# Patient Record
Sex: MALE | Race: BLACK OR AFRICAN AMERICAN | NOT HISPANIC OR LATINO | Employment: OTHER | URBAN - METROPOLITAN AREA
[De-identification: names, ages, dates, MRNs, and addresses within clinical notes are randomized per-mention and may not be internally consistent; named-entity substitution may affect disease eponyms.]

---

## 2017-12-23 RX ORDER — PHENYTOIN SODIUM 100 MG/1
CAPSULE, EXTENDED RELEASE ORAL
Qty: 90 CAPSULE | Refills: 1 | Status: SHIPPED | OUTPATIENT
Start: 2017-12-23 | End: 2019-02-19 | Stop reason: SDUPTHER

## 2017-12-24 NOTE — TELEPHONE ENCOUNTER
Call tell lab due CBC,CMP,Lipid,T4,TSH, dilantin level, U/A if DM add HGBA1C if over 2-3 yrs add Chest Xray EKG needs see me 2 weeks after lab get results, get in computer , get refills.

## 2018-08-30 ENCOUNTER — OFFICE VISIT (OUTPATIENT)
Dept: PRIMARY CARE CLINIC | Facility: CLINIC | Age: 68
End: 2018-08-30
Payer: MEDICARE

## 2018-08-30 VITALS
HEART RATE: 60 BPM | SYSTOLIC BLOOD PRESSURE: 127 MMHG | TEMPERATURE: 98 F | OXYGEN SATURATION: 99 % | WEIGHT: 113 LBS | BODY MASS INDEX: 18.16 KG/M2 | HEIGHT: 66 IN | RESPIRATION RATE: 18 BRPM | DIASTOLIC BLOOD PRESSURE: 64 MMHG

## 2018-08-30 DIAGNOSIS — K08.109 EDENTULOUS: ICD-10-CM

## 2018-08-30 DIAGNOSIS — R62.7 FTT (FAILURE TO THRIVE) IN ADULT: ICD-10-CM

## 2018-08-30 DIAGNOSIS — L29.9 PRURITUS: ICD-10-CM

## 2018-08-30 DIAGNOSIS — Z00.00 PHYSICAL EXAM: ICD-10-CM

## 2018-08-30 DIAGNOSIS — E44.1 MILD PROTEIN-CALORIE MALNUTRITION: ICD-10-CM

## 2018-08-30 DIAGNOSIS — J40 BRONCHITIS: ICD-10-CM

## 2018-08-30 DIAGNOSIS — Z85.46 HISTORY OF PROSTATE CANCER: ICD-10-CM

## 2018-08-30 DIAGNOSIS — J32.9 SINUSITIS, UNSPECIFIED CHRONICITY, UNSPECIFIED LOCATION: Primary | ICD-10-CM

## 2018-08-30 DIAGNOSIS — Z93.59 SUPRAPUBIC CATHETER: ICD-10-CM

## 2018-08-30 DIAGNOSIS — M81.6 LOCALIZED OSTEOPOROSIS OF LEQUESNE: ICD-10-CM

## 2018-08-30 PROCEDURE — 96372 THER/PROPH/DIAG INJ SC/IM: CPT | Mod: PBBFAC,PN

## 2018-08-30 PROCEDURE — 99213 OFFICE O/P EST LOW 20 MIN: CPT | Mod: S$PBB,,, | Performed by: FAMILY MEDICINE

## 2018-08-30 PROCEDURE — 1101F PT FALLS ASSESS-DOCD LE1/YR: CPT | Mod: CPTII,,, | Performed by: FAMILY MEDICINE

## 2018-08-30 PROCEDURE — 99999 PR PBB SHADOW E&M-EST. PATIENT-LVL III: CPT | Mod: PBBFAC,,, | Performed by: FAMILY MEDICINE

## 2018-08-30 PROCEDURE — 99213 OFFICE O/P EST LOW 20 MIN: CPT | Mod: PBBFAC,PN,25 | Performed by: FAMILY MEDICINE

## 2018-08-30 PROCEDURE — 99499 UNLISTED E&M SERVICE: CPT | Mod: S$GLB,,, | Performed by: FAMILY MEDICINE

## 2018-08-30 RX ORDER — ASPIRIN 81 MG/1
81 TABLET ORAL DAILY
COMMUNITY

## 2018-08-30 RX ORDER — PROMETHAZINE HYDROCHLORIDE AND CODEINE PHOSPHATE 6.25; 1 MG/5ML; MG/5ML
5 SOLUTION ORAL EVERY 6 HOURS PRN
Qty: 180 ML | Refills: 0 | Status: SHIPPED | OUTPATIENT
Start: 2018-08-30 | End: 2019-02-19

## 2018-08-30 RX ORDER — BETAMETHASONE SODIUM PHOSPHATE AND BETAMETHASONE ACETATE 3; 3 MG/ML; MG/ML
12 INJECTION, SUSPENSION INTRA-ARTICULAR; INTRALESIONAL; INTRAMUSCULAR; SOFT TISSUE
Status: COMPLETED | OUTPATIENT
Start: 2018-08-30 | End: 2018-08-30

## 2018-08-30 RX ORDER — AZITHROMYCIN 250 MG/1
TABLET, FILM COATED ORAL
Qty: 6 TABLET | Refills: 0 | Status: SHIPPED | OUTPATIENT
Start: 2018-08-30 | End: 2018-09-03

## 2018-08-30 RX ORDER — METHYLPREDNISOLONE 4 MG/1
TABLET ORAL
Qty: 1 PACKAGE | Refills: 0 | Status: SHIPPED | OUTPATIENT
Start: 2018-08-30 | End: 2019-02-19

## 2018-08-30 RX ADMIN — BETAMETHASONE SODIUM PHOSPHATE AND BETAMETHASONE ACETATE 12 MG: 3; 3 INJECTION, SUSPENSION INTRA-ARTICULAR; INTRALESIONAL; INTRAMUSCULAR at 03:08
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
## 2018-08-30 NOTE — PROGRESS NOTES
Patient ID by name and . NKDA. Celestone 12 mg IM injection given in left ventrogluteal using aseptic technique. Aspirated with no blood noted. Patient tolerated well. Given per physicians order. No adverse reaction noted

## 2019-02-06 PROBLEM — E78.5 HYPERLIPIDEMIA: Status: ACTIVE | Noted: 2019-02-06

## 2019-02-19 ENCOUNTER — OFFICE VISIT (OUTPATIENT)
Dept: PRIMARY CARE CLINIC | Facility: CLINIC | Age: 69
End: 2019-02-19
Payer: MEDICARE

## 2019-02-19 VITALS
BODY MASS INDEX: 18.48 KG/M2 | HEART RATE: 54 BPM | OXYGEN SATURATION: 99 % | HEIGHT: 66 IN | WEIGHT: 115 LBS | SYSTOLIC BLOOD PRESSURE: 138 MMHG | RESPIRATION RATE: 18 BRPM | DIASTOLIC BLOOD PRESSURE: 68 MMHG | TEMPERATURE: 98 F

## 2019-02-19 DIAGNOSIS — R62.7 FTT (FAILURE TO THRIVE) IN ADULT: ICD-10-CM

## 2019-02-19 DIAGNOSIS — Z93.59 SUPRAPUBIC CATHETER: ICD-10-CM

## 2019-02-19 DIAGNOSIS — H26.9 CATARACT OF LEFT EYE, UNSPECIFIED CATARACT TYPE: ICD-10-CM

## 2019-02-19 DIAGNOSIS — D64.9 ANEMIA, UNSPECIFIED TYPE: ICD-10-CM

## 2019-02-19 DIAGNOSIS — R03.0 BORDERLINE HYPERTENSION: ICD-10-CM

## 2019-02-19 DIAGNOSIS — Z85.46 HISTORY OF PROSTATE CANCER: ICD-10-CM

## 2019-02-19 DIAGNOSIS — E78.5 HYPERLIPIDEMIA, UNSPECIFIED HYPERLIPIDEMIA TYPE: Primary | ICD-10-CM

## 2019-02-19 DIAGNOSIS — E87.6 HYPOKALEMIA: ICD-10-CM

## 2019-02-19 DIAGNOSIS — K08.109 EDENTULOUS: ICD-10-CM

## 2019-02-19 PROCEDURE — 99214 PR OFFICE/OUTPT VISIT, EST, LEVL IV, 30-39 MIN: ICD-10-PCS | Mod: S$GLB,,, | Performed by: FAMILY MEDICINE

## 2019-02-19 PROCEDURE — 99499 UNLISTED E&M SERVICE: CPT | Mod: S$GLB,,, | Performed by: FAMILY MEDICINE

## 2019-02-19 PROCEDURE — 1101F PT FALLS ASSESS-DOCD LE1/YR: CPT | Mod: CPTII,S$GLB,, | Performed by: FAMILY MEDICINE

## 2019-02-19 PROCEDURE — 99499 RISK ADDL DX/OHS AUDIT: ICD-10-PCS | Mod: S$GLB,,, | Performed by: FAMILY MEDICINE

## 2019-02-19 PROCEDURE — 99214 OFFICE O/P EST MOD 30 MIN: CPT | Mod: S$GLB,,, | Performed by: FAMILY MEDICINE

## 2019-02-19 PROCEDURE — 1101F PR PT FALLS ASSESS DOC 0-1 FALLS W/OUT INJ PAST YR: ICD-10-PCS | Mod: CPTII,S$GLB,, | Performed by: FAMILY MEDICINE

## 2019-02-19 PROCEDURE — 99999 PR PBB SHADOW E&M-EST. PATIENT-LVL III: CPT | Mod: PBBFAC,,, | Performed by: FAMILY MEDICINE

## 2019-02-19 PROCEDURE — 99999 PR PBB SHADOW E&M-EST. PATIENT-LVL III: ICD-10-PCS | Mod: PBBFAC,,, | Performed by: FAMILY MEDICINE

## 2019-02-19 RX ORDER — POTASSIUM CHLORIDE 750 MG/1
10 CAPSULE, EXTENDED RELEASE ORAL ONCE
Qty: 30 CAPSULE | Refills: 2 | Status: SHIPPED | OUTPATIENT
Start: 2019-02-19 | End: 2019-02-19

## 2019-02-19 RX ORDER — ATORVASTATIN CALCIUM 10 MG/1
10 TABLET, FILM COATED ORAL DAILY
Qty: 90 TABLET | Refills: 3 | Status: SHIPPED | OUTPATIENT
Start: 2019-02-19 | End: 2020-02-19

## 2019-02-19 RX ORDER — PHENYTOIN SODIUM 100 MG/1
100 CAPSULE, EXTENDED RELEASE ORAL 3 TIMES DAILY
Qty: 90 CAPSULE | Refills: 3 | Status: SHIPPED | OUTPATIENT
Start: 2019-02-19 | End: 2023-10-27
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
## 2019-03-20 DIAGNOSIS — Z12.11 COLON CANCER SCREENING: ICD-10-CM

## 2019-04-10 ENCOUNTER — TELEPHONE (OUTPATIENT)
Dept: PRIMARY CARE CLINIC | Facility: CLINIC | Age: 69
End: 2019-04-10

## 2019-04-10 DIAGNOSIS — E46 PROTEIN-CALORIE MALNUTRITION, UNSPECIFIED SEVERITY: ICD-10-CM

## 2019-04-10 DIAGNOSIS — D50.0 IRON DEFICIENCY ANEMIA DUE TO CHRONIC BLOOD LOSS: Primary | ICD-10-CM

## 2019-04-10 NOTE — TELEPHONE ENCOUNTER
PLEASE SIGN LAB ORDERS. PENDING       ----- Message from René Bauer MD sent at 3/28/2019 10:12 PM CDT -----  Call tell HDL 36  better 100 Chol 184 Hct 33.6 was 38.9 All lab basically WNL except anemia needs serum iron, TIBC, B12, folic acid  Stool guaiac Then call results 2 weeks later -- Needs redo routine la 6 mo CBC,CMP,Lipid see me 2 weeKS LATER

## 2019-04-16 ENCOUNTER — TELEPHONE (OUTPATIENT)
Dept: PRIMARY CARE CLINIC | Facility: CLINIC | Age: 69
End: 2019-04-16

## 2019-04-16 NOTE — TELEPHONE ENCOUNTER
----- Message from René Bauer MD sent at 3/28/2019 10:12 PM CDT -----  Call tell HDL 36  better 100 Chol 184 Hct 33.6 was 38.9 All lab basically WNL except anemia needs serum iron, TIBC, B12, folic acid  Stool guaiac Then call results 2 weeks later -- Needs redo routine la 6 mo CBC,CMP,Lipid see me 2 weeKS LATER

## 2019-04-17 NOTE — TELEPHONE ENCOUNTER
See lab results   ----- Message from Alessandra Weiss sent at 4/17/2019  2:38 PM CDT -----  Type:  Patient Returning Call    Who Called:  Wife/Foster  Who Left Message for Patient:  n/a  Does the patient know what this is regarding?:  no  Best Call Back Number:  [phone] (home)

## 2019-04-29 NOTE — TELEPHONE ENCOUNTER
Pended B12 injection. Please Advise.     ----- Message from René Bauer MD sent at 4/24/2019 10:32 PM CDT -----  Call tell pt B12 and folic acid deficient --needs be on folic acid 1 mg 1 po qd rest of life Needs be on Vit B12 1 cc IM now and x 10 weeks then 1 cc IM q 3-4 weeks fror rest of life  rest of life

## 2019-04-29 NOTE — TELEPHONE ENCOUNTER
PENDED RX PLEASE ADVISE     ----- Message from René Bauer MD sent at 4/24/2019 10:32 PM CDT -----  Call tell pt B12 and folic acid deficient --needs be on folic acid 1 mg 1 po qd rest of life Needs be on Vit B12 1 cc IM now and x 10 weeks then 1 cc IM q 3-4 weeks fror rest of life  rest of life

## 2019-05-01 RX ORDER — CYANOCOBALAMIN 1000 UG/ML
1000 INJECTION, SOLUTION INTRAMUSCULAR; SUBCUTANEOUS WEEKLY
Qty: 4 ML | Refills: 10 | Status: SHIPPED | OUTPATIENT
Start: 2019-05-01 | End: 2019-07-04

## 2019-05-01 RX ORDER — FOLIC ACID 1 MG/1
1 TABLET ORAL DAILY
Qty: 90 TABLET | Refills: 3 | Status: SHIPPED | OUTPATIENT
Start: 2019-05-01 | End: 2020-04-30

## 2019-05-02 ENCOUNTER — CLINICAL SUPPORT (OUTPATIENT)
Dept: PRIMARY CARE CLINIC | Facility: CLINIC | Age: 69
End: 2019-05-02
Payer: MEDICARE

## 2019-05-02 DIAGNOSIS — E53.8 VITAMIN B12 DEFICIENCY: Primary | ICD-10-CM

## 2019-05-02 PROCEDURE — 96372 THER/PROPH/DIAG INJ SC/IM: CPT | Mod: S$GLB,,, | Performed by: FAMILY MEDICINE

## 2019-05-02 PROCEDURE — 99999 PR PBB SHADOW E&M-EST. PATIENT-LVL I: CPT | Mod: PBBFAC,,,

## 2019-05-02 PROCEDURE — 99999 PR PBB SHADOW E&M-EST. PATIENT-LVL I: ICD-10-PCS | Mod: PBBFAC,,,

## 2019-05-02 PROCEDURE — 96372 PR INJECTION,THERAP/PROPH/DIAG2ST, IM OR SUBCUT: ICD-10-PCS | Mod: S$GLB,,, | Performed by: FAMILY MEDICINE

## 2019-05-02 RX ADMIN — CYANOCOBALAMIN 1000 MCG: 1000 INJECTION, SOLUTION INTRAMUSCULAR; SUBCUTANEOUS at 11:05

## 2019-05-02 NOTE — PROGRESS NOTES
Patient ID verified by name and . NKDA. B12 1000 mcg IM injection given in left deltoid using aseptic technique. Aspirated with no blood noted. Patient tolerated well. Given per physicians order. No adverse reaction noted.

## 2019-05-09 ENCOUNTER — CLINICAL SUPPORT (OUTPATIENT)
Dept: PRIMARY CARE CLINIC | Facility: CLINIC | Age: 69
End: 2019-05-09
Payer: MEDICARE

## 2019-05-09 PROCEDURE — 96372 THER/PROPH/DIAG INJ SC/IM: CPT | Mod: S$GLB,,, | Performed by: FAMILY MEDICINE

## 2019-05-09 PROCEDURE — 96372 PR INJECTION,THERAP/PROPH/DIAG2ST, IM OR SUBCUT: ICD-10-PCS | Mod: S$GLB,,, | Performed by: FAMILY MEDICINE

## 2019-05-09 PROCEDURE — 99999 PR PBB SHADOW E&M-EST. PATIENT-LVL I: CPT | Mod: PBBFAC,,,

## 2019-05-09 PROCEDURE — 99999 PR PBB SHADOW E&M-EST. PATIENT-LVL I: ICD-10-PCS | Mod: PBBFAC,,,

## 2019-05-09 RX ORDER — CYANOCOBALAMIN 1000 UG/ML
1000 INJECTION, SOLUTION INTRAMUSCULAR; SUBCUTANEOUS
Status: SHIPPED | OUTPATIENT
Start: 2019-05-09

## 2019-05-09 RX ADMIN — CYANOCOBALAMIN 1000 MCG: 1000 INJECTION, SOLUTION INTRAMUSCULAR; SUBCUTANEOUS at 02:05

## 2019-05-09 NOTE — PROGRESS NOTES
Verified pt ID using name and . NKDA. Administered 1000 mcg B12 in right deltoid per physician order using aseptic technique. Aspirated and no blood return noted. Pt tolerated well with no adverse reactions noted.

## 2019-05-16 ENCOUNTER — CLINICAL SUPPORT (OUTPATIENT)
Dept: PRIMARY CARE CLINIC | Facility: CLINIC | Age: 69
End: 2019-05-16
Payer: MEDICARE

## 2019-05-16 PROCEDURE — 99999 PR PBB SHADOW E&M-EST. PATIENT-LVL I: ICD-10-PCS | Mod: PBBFAC,,,

## 2019-05-16 PROCEDURE — 99999 PR PBB SHADOW E&M-EST. PATIENT-LVL I: CPT | Mod: PBBFAC,,,

## 2019-05-16 PROCEDURE — 96372 THER/PROPH/DIAG INJ SC/IM: CPT | Mod: S$GLB,,, | Performed by: FAMILY MEDICINE

## 2019-05-16 PROCEDURE — 96372 PR INJECTION,THERAP/PROPH/DIAG2ST, IM OR SUBCUT: ICD-10-PCS | Mod: S$GLB,,, | Performed by: FAMILY MEDICINE

## 2019-05-16 RX ADMIN — CYANOCOBALAMIN 1000 MCG: 1000 INJECTION, SOLUTION INTRAMUSCULAR; SUBCUTANEOUS at 10:05

## 2019-05-16 NOTE — PROGRESS NOTES
Verified pt ID using name and . NKDA. Administered 1000 mcg B12 in left deltoid per physician order using aseptic technique. Aspirated and no blood return noted. Pt tolerated well with no adverse reactions noted.

## 2019-05-23 ENCOUNTER — CLINICAL SUPPORT (OUTPATIENT)
Dept: PRIMARY CARE CLINIC | Facility: CLINIC | Age: 69
End: 2019-05-23
Payer: MEDICARE

## 2019-05-23 PROCEDURE — 96372 PR INJECTION,THERAP/PROPH/DIAG2ST, IM OR SUBCUT: ICD-10-PCS | Mod: S$GLB,,, | Performed by: FAMILY MEDICINE

## 2019-05-23 PROCEDURE — 99999 PR PBB SHADOW E&M-EST. PATIENT-LVL I: ICD-10-PCS | Mod: PBBFAC,,,

## 2019-05-23 PROCEDURE — 99999 PR PBB SHADOW E&M-EST. PATIENT-LVL I: CPT | Mod: PBBFAC,,,

## 2019-05-23 PROCEDURE — 96372 THER/PROPH/DIAG INJ SC/IM: CPT | Mod: S$GLB,,, | Performed by: FAMILY MEDICINE

## 2019-05-23 RX ADMIN — CYANOCOBALAMIN 1000 MCG: 1000 INJECTION, SOLUTION INTRAMUSCULAR; SUBCUTANEOUS at 11:05

## 2019-05-23 NOTE — PROGRESS NOTES
Patient ID verified by name and . NKDA. B12 1000 mcg IM injection given in right deltoid using aseptic technqiue. Aspirated with no blood noted. Patient tolerated well. Given per physicians order. No adverse reaction noted.

## 2019-05-30 ENCOUNTER — CLINICAL SUPPORT (OUTPATIENT)
Dept: PRIMARY CARE CLINIC | Facility: CLINIC | Age: 69
End: 2019-05-30
Payer: MEDICARE

## 2019-05-30 PROCEDURE — 96372 PR INJECTION,THERAP/PROPH/DIAG2ST, IM OR SUBCUT: ICD-10-PCS | Mod: S$GLB,,, | Performed by: FAMILY MEDICINE

## 2019-05-30 PROCEDURE — 96372 THER/PROPH/DIAG INJ SC/IM: CPT | Mod: S$GLB,,, | Performed by: FAMILY MEDICINE

## 2019-05-30 RX ADMIN — CYANOCOBALAMIN 1000 MCG: 1000 INJECTION, SOLUTION INTRAMUSCULAR; SUBCUTANEOUS at 10:05

## 2019-05-30 NOTE — PROGRESS NOTES
Patient Id verified by name and . NKDA. B12 1000 mcg IM injection given in right deltoid using aseptic technique. Aspirated with no blood noted. Patient tolerated well. Given per physicians order. No adverse reaction noted.

## 2019-06-06 ENCOUNTER — CLINICAL SUPPORT (OUTPATIENT)
Dept: PRIMARY CARE CLINIC | Facility: CLINIC | Age: 69
End: 2019-06-06
Payer: MEDICARE

## 2019-06-06 PROCEDURE — 96372 THER/PROPH/DIAG INJ SC/IM: CPT | Mod: S$GLB,,, | Performed by: FAMILY MEDICINE

## 2019-06-06 PROCEDURE — 96372 PR INJECTION,THERAP/PROPH/DIAG2ST, IM OR SUBCUT: ICD-10-PCS | Mod: S$GLB,,, | Performed by: FAMILY MEDICINE

## 2019-06-06 PROCEDURE — 99999 PR PBB SHADOW E&M-EST. PATIENT-LVL I: ICD-10-PCS | Mod: PBBFAC,,,

## 2019-06-06 PROCEDURE — 99999 PR PBB SHADOW E&M-EST. PATIENT-LVL I: CPT | Mod: PBBFAC,,,

## 2019-06-06 RX ADMIN — CYANOCOBALAMIN 1000 MCG: 1000 INJECTION, SOLUTION INTRAMUSCULAR; SUBCUTANEOUS at 10:06

## 2019-06-14 ENCOUNTER — CLINICAL SUPPORT (OUTPATIENT)
Dept: PRIMARY CARE CLINIC | Facility: CLINIC | Age: 69
End: 2019-06-14
Payer: MEDICARE

## 2019-06-14 PROCEDURE — 99999 PR PBB SHADOW E&M-EST. PATIENT-LVL I: ICD-10-PCS | Mod: PBBFAC,,,

## 2019-06-14 PROCEDURE — 96372 THER/PROPH/DIAG INJ SC/IM: CPT | Mod: S$GLB,,, | Performed by: FAMILY MEDICINE

## 2019-06-14 PROCEDURE — 96372 PR INJECTION,THERAP/PROPH/DIAG2ST, IM OR SUBCUT: ICD-10-PCS | Mod: S$GLB,,, | Performed by: FAMILY MEDICINE

## 2019-06-14 PROCEDURE — 99999 PR PBB SHADOW E&M-EST. PATIENT-LVL I: CPT | Mod: PBBFAC,,,

## 2019-06-14 RX ADMIN — CYANOCOBALAMIN 1000 MCG: 1000 INJECTION, SOLUTION INTRAMUSCULAR; SUBCUTANEOUS at 10:06

## 2019-06-21 ENCOUNTER — CLINICAL SUPPORT (OUTPATIENT)
Dept: PRIMARY CARE CLINIC | Facility: CLINIC | Age: 69
End: 2019-06-21
Payer: MEDICARE

## 2019-06-21 PROCEDURE — 99999 PR PBB SHADOW E&M-EST. PATIENT-LVL I: CPT | Mod: PBBFAC,,,

## 2019-06-21 PROCEDURE — 96372 PR INJECTION,THERAP/PROPH/DIAG2ST, IM OR SUBCUT: ICD-10-PCS | Mod: S$GLB,,, | Performed by: FAMILY MEDICINE

## 2019-06-21 PROCEDURE — 99999 PR PBB SHADOW E&M-EST. PATIENT-LVL I: ICD-10-PCS | Mod: PBBFAC,,,

## 2019-06-21 PROCEDURE — 96372 THER/PROPH/DIAG INJ SC/IM: CPT | Mod: S$GLB,,, | Performed by: FAMILY MEDICINE

## 2019-06-21 RX ADMIN — CYANOCOBALAMIN 1000 MCG: 1000 INJECTION, SOLUTION INTRAMUSCULAR; SUBCUTANEOUS at 09:06

## 2019-06-21 NOTE — PROGRESS NOTES
Patient ID verified by name and . NKDA. B12 1000 mcg IM injection given in left deltoid using aseptic technqiue. Aspirated with no blood noted. Patient tolerated well. Given per physicians order. No adverse reaction noted.

## 2019-06-28 ENCOUNTER — CLINICAL SUPPORT (OUTPATIENT)
Dept: PRIMARY CARE CLINIC | Facility: CLINIC | Age: 69
End: 2019-06-28
Payer: MEDICARE

## 2019-06-28 PROCEDURE — 99999 PR PBB SHADOW E&M-EST. PATIENT-LVL I: CPT | Mod: PBBFAC,,,

## 2019-06-28 PROCEDURE — 96372 PR INJECTION,THERAP/PROPH/DIAG2ST, IM OR SUBCUT: ICD-10-PCS | Mod: S$GLB,,, | Performed by: FAMILY MEDICINE

## 2019-06-28 PROCEDURE — 99999 PR PBB SHADOW E&M-EST. PATIENT-LVL I: ICD-10-PCS | Mod: PBBFAC,,,

## 2019-06-28 PROCEDURE — 96372 THER/PROPH/DIAG INJ SC/IM: CPT | Mod: S$GLB,,, | Performed by: FAMILY MEDICINE

## 2019-06-28 RX ADMIN — CYANOCOBALAMIN 1000 MCG: 1000 INJECTION, SOLUTION INTRAMUSCULAR; SUBCUTANEOUS at 10:06

## 2019-07-15 ENCOUNTER — PATIENT OUTREACH (OUTPATIENT)
Dept: ADMINISTRATIVE | Facility: HOSPITAL | Age: 69
End: 2019-07-15

## 2019-07-15 ENCOUNTER — TELEPHONE (OUTPATIENT)
Dept: ADMINISTRATIVE | Facility: HOSPITAL | Age: 69
End: 2019-07-15

## 2019-07-15 DIAGNOSIS — D64.9 ANEMIA, UNSPECIFIED TYPE: Primary | ICD-10-CM

## 2019-07-15 DIAGNOSIS — Z11.59 NEED FOR HEPATITIS C SCREENING TEST: ICD-10-CM

## 2019-07-15 DIAGNOSIS — Z12.11 SCREENING FOR COLON CANCER: ICD-10-CM

## 2019-07-15 DIAGNOSIS — D64.9 ANEMIA, UNSPECIFIED TYPE: ICD-10-CM

## 2019-07-15 NOTE — PROGRESS NOTES
Immunizations reviewed. Legacy reviewed. Placed call to patient to discuss/schedule overdue HM. Spoke w/ Cristino. Cristino advised that she would prefer if patient had a Colonoscopy. Does not want Fit Kit or Stool Occult x 1. Pended referral for physician and advised someone will call to set up, Cristino agreeable. Pre-visit chart review completed.

## 2019-07-15 NOTE — TELEPHONE ENCOUNTER
Patient's family requesting a Colonoscopy.   They do not want Fit Kit/Stool Occult x 1   Please sign referral

## 2019-07-29 ENCOUNTER — CLINICAL SUPPORT (OUTPATIENT)
Dept: PRIMARY CARE CLINIC | Facility: CLINIC | Age: 69
End: 2019-07-29
Payer: MEDICARE

## 2019-07-29 PROCEDURE — 96372 THER/PROPH/DIAG INJ SC/IM: CPT | Mod: S$GLB,,, | Performed by: FAMILY MEDICINE

## 2019-07-29 PROCEDURE — 99999 PR PBB SHADOW E&M-EST. PATIENT-LVL I: ICD-10-PCS | Mod: PBBFAC,,,

## 2019-07-29 PROCEDURE — 99999 PR PBB SHADOW E&M-EST. PATIENT-LVL I: CPT | Mod: PBBFAC,,,

## 2019-07-29 PROCEDURE — 96372 PR INJECTION,THERAP/PROPH/DIAG2ST, IM OR SUBCUT: ICD-10-PCS | Mod: S$GLB,,, | Performed by: FAMILY MEDICINE

## 2019-07-29 RX ADMIN — CYANOCOBALAMIN 1000 MCG: 1000 INJECTION, SOLUTION INTRAMUSCULAR; SUBCUTANEOUS at 08:07

## 2019-07-29 NOTE — PROGRESS NOTES
Verified pt ID using name and . NKDA. Administered B-12 1,000 mcg IM in L. Deltoid per physician order using aseptic technique. Aspirated and no blood return noted. Pt tolerated well with no adverse reactions noted.

## 2019-07-30 ENCOUNTER — OFFICE VISIT (OUTPATIENT)
Dept: PRIMARY CARE CLINIC | Facility: CLINIC | Age: 69
End: 2019-07-30
Payer: MEDICARE

## 2019-07-30 VITALS
RESPIRATION RATE: 19 BRPM | TEMPERATURE: 98 F | HEIGHT: 66 IN | WEIGHT: 115 LBS | DIASTOLIC BLOOD PRESSURE: 70 MMHG | SYSTOLIC BLOOD PRESSURE: 121 MMHG | HEART RATE: 49 BPM | OXYGEN SATURATION: 98 % | BODY MASS INDEX: 18.48 KG/M2

## 2019-07-30 DIAGNOSIS — H91.91 HEARING LOSS OF RIGHT EAR, UNSPECIFIED HEARING LOSS TYPE: ICD-10-CM

## 2019-07-30 DIAGNOSIS — R03.0 BORDERLINE HYPERTENSION: Primary | ICD-10-CM

## 2019-07-30 DIAGNOSIS — D64.9 ANEMIA, UNSPECIFIED TYPE: ICD-10-CM

## 2019-07-30 DIAGNOSIS — R62.7 FTT (FAILURE TO THRIVE) IN ADULT: ICD-10-CM

## 2019-07-30 DIAGNOSIS — Z11.59 NEED FOR HEPATITIS C SCREENING TEST: ICD-10-CM

## 2019-07-30 DIAGNOSIS — K08.109 EDENTULOUS: ICD-10-CM

## 2019-07-30 DIAGNOSIS — E78.5 HYPERLIPIDEMIA, UNSPECIFIED HYPERLIPIDEMIA TYPE: ICD-10-CM

## 2019-07-30 DIAGNOSIS — Z93.59 SUPRAPUBIC CATHETER: ICD-10-CM

## 2019-07-30 DIAGNOSIS — Z85.46 HISTORY OF PROSTATE CANCER: ICD-10-CM

## 2019-07-30 PROCEDURE — 90471 TDAP VACCINE GREATER THAN OR EQUAL TO 7YO IM: ICD-10-PCS | Mod: S$GLB,,, | Performed by: FAMILY MEDICINE

## 2019-07-30 PROCEDURE — 1101F PT FALLS ASSESS-DOCD LE1/YR: CPT | Mod: CPTII,S$GLB,, | Performed by: FAMILY MEDICINE

## 2019-07-30 PROCEDURE — 99499 UNLISTED E&M SERVICE: CPT | Mod: S$GLB,,, | Performed by: FAMILY MEDICINE

## 2019-07-30 PROCEDURE — 90715 TDAP VACCINE GREATER THAN OR EQUAL TO 7YO IM: ICD-10-PCS | Mod: S$GLB,,, | Performed by: FAMILY MEDICINE

## 2019-07-30 PROCEDURE — 99999 PR PBB SHADOW E&M-EST. PATIENT-LVL IV: CPT | Mod: PBBFAC,,, | Performed by: FAMILY MEDICINE

## 2019-07-30 PROCEDURE — 99213 PR OFFICE/OUTPT VISIT, EST, LEVL III, 20-29 MIN: ICD-10-PCS | Mod: 25,S$GLB,, | Performed by: FAMILY MEDICINE

## 2019-07-30 PROCEDURE — 99999 PR PBB SHADOW E&M-EST. PATIENT-LVL IV: ICD-10-PCS | Mod: PBBFAC,,, | Performed by: FAMILY MEDICINE

## 2019-07-30 PROCEDURE — 99213 OFFICE O/P EST LOW 20 MIN: CPT | Mod: 25,S$GLB,, | Performed by: FAMILY MEDICINE

## 2019-07-30 PROCEDURE — 90471 IMMUNIZATION ADMIN: CPT | Mod: S$GLB,,, | Performed by: FAMILY MEDICINE

## 2019-07-30 PROCEDURE — 1101F PR PT FALLS ASSESS DOC 0-1 FALLS W/OUT INJ PAST YR: ICD-10-PCS | Mod: CPTII,S$GLB,, | Performed by: FAMILY MEDICINE

## 2019-07-30 PROCEDURE — 90715 TDAP VACCINE 7 YRS/> IM: CPT | Mod: S$GLB,,, | Performed by: FAMILY MEDICINE

## 2019-07-30 PROCEDURE — 99499 RISK ADDL DX/OHS AUDIT: ICD-10-PCS | Mod: S$GLB,,, | Performed by: FAMILY MEDICINE

## 2019-07-30 NOTE — PROGRESS NOTES
Verified pt ID using name and . NKDA. Administered Tdap vaccine in right deltoid per physician order using aseptic technique. Aspirated and no blood return noted. Pt tolerated well with no adverse reactions noted.

## 2019-07-30 NOTE — PROGRESS NOTES
Subjective:       Patient ID: Jorge Bourne is a 69 y.o. male.    Chief Complaint: Hearing Loss (right ear )    HPI:  69-year-old white male in for hearing loss in the right ear--no fever, occasional stuffy nose, no sore throat no cough--patient does occasionally get cerumen impaction--did put a Q-tip into the ear recently in got some wax out.  ROS:  Skin: no psoriasis, eczema, skin cancer  HEENT: No headache, ocular pain, blurred vision, diplopia, epistaxis, hoarseness change in voice, thyroid trouble  Lung: No pneumonia, asthma, Tb, wheezing, SOB, no smoke   Heart: No chest pain, ankle edema, palpitations, MI, alex murmur, + borderline hypertension 121/70,+ hyperlipidemia  Abdomen: No nausea, vomiting, diarrhea, constipation, ulcers, hepatitis, gallbladder disease, melena, hematochezia, hematemesis  : no UTI, renal disease, stones--has a suprapubic catheter--history of prostate cancer. Had TURP.  Gets catheter change Q 2 months  MS:  O/A, lupus, rheumatoid, gout  Neuro: No dizziness, LOC, seizures   No diabetes, no anemia, no anxiety, no depression   , 4 children, retired, lives with wife  Objective:   Physical Exam:  General: Well nourished, well developed, no acute distress +thin  Skin: No lesions  HEENT: Eyes PERRLA, EOM intact, left cataract  nose clear, throat non erythematous ears TMs clear  NECK: Supple, no bruits, No JVD, no nodes  Lungs: Clear, no rales, rhonchi, wheezing  Heart: Regular rate and rhythm, no murmurs, gallops, or rubs  Abdomen: flat, bowel sounds positive, no tenderness, or organomegaly  --suprapubic catheter  MS: Range of motion and muscle strength intact  Neuro: Alert, CN intact, oriented X 3  Extremities: No cyanosis, clubbing, or edema         Assessment:       1. Borderline hypertension    2. Hearing loss of right ear, unspecified hearing loss type    3. Hyperlipidemia, unspecified hyperlipidemia type    4. Edentulous    5. Suprapubic catheter    6. History of prostate  cancer    7. FTT (failure to thrive) in adult    8. Anemia, unspecified type    9. Need for hepatitis C screening test        Plan:       Borderline hypertension    Hearing loss of right ear, unspecified hearing loss type    Hyperlipidemia, unspecified hyperlipidemia type  -     Ambulatory Referral to Colorectal Surgery  -     CBC auto differential; Future; Expected date: 07/30/2019  -     Comprehensive metabolic panel; Future; Expected date: 07/30/2019  -     Lipid panel; Future; Expected date: 07/30/2019    Edentulous    Suprapubic catheter  -     Prostate Specific Antigen, Diagnostic; Future; Expected date: 07/30/2019    History of prostate cancer  -     Prostate Specific Antigen, Diagnostic; Future; Expected date: 07/30/2019    FTT (failure to thrive) in adult    Anemia, unspecified type    Need for hepatitis C screening test  -     Hepatitis C antibody; Future; Expected date: 07/30/2019    Other orders  -     (In Office Administered) Tdap Vaccine        Patient in for hearing loss--right ear only--history cerumen impaction in the past--did stick a Q-tip in ER get wax out recently-nurse to do irrigation  Lab due in September CBCs CMP lipid PSA  Blood pressure now stable 121/70  Patient still thin--told take centrum Silver--Ensure 1 can t.i.d.--if needed can a add B12 shots and Periactin and patient should weigh self daily if loses 5 lb come in for more extensive workup  Suprapubic catheter history of prostate cancer should follow up with  mg yearly--axillary gets catheter change Q 2 months  Health maintenance patient needs tetanus shot/pneumococcal vaccine

## 2019-08-26 ENCOUNTER — TELEPHONE (OUTPATIENT)
Dept: PRIMARY CARE CLINIC | Facility: CLINIC | Age: 69
End: 2019-08-26

## 2019-08-26 NOTE — TELEPHONE ENCOUNTER
----- Message from Isabel Aparicio sent at 8/26/2019  3:17 PM CDT -----  Contact: Isabel  Patient refused to do colonoscopy ask to speak to nurse would like to do different test

## 2019-09-20 ENCOUNTER — PATIENT OUTREACH (OUTPATIENT)
Dept: ADMINISTRATIVE | Facility: HOSPITAL | Age: 69
End: 2019-09-20

## 2019-09-21 NOTE — PROGRESS NOTES
[facility], [facility], [facility] Provider Portal, and chart review completed.  Hepatitis C Screening uploaded to .  Per [facility] Provider Portal --- Colonoscopy done 2008. Patient due.   Received Fit Kit.

## 2020-05-14 DIAGNOSIS — Z12.11 COLON CANCER SCREENING: ICD-10-CM

## 2022-09-08 NOTE — PROGRESS NOTES
Patient ID verified by name and . NKDA. B12 1000 mcg IM injection given in right deltoid using aseptic technique. Aspirated with no blood noted. Patient tolerated well. Given per physicians order. No adverse reaction noted.   
No

## 2022-11-28 ENCOUNTER — PATIENT OUTREACH (OUTPATIENT)
Dept: ADMINISTRATIVE | Facility: HOSPITAL | Age: 72
End: 2022-11-28
Payer: MEDICARE

## 2022-11-28 NOTE — PROGRESS NOTES
Health Maintenance Due   Topic Date Due    COVID-19 Vaccine (1) Never done    Pneumococcal Vaccines (Age 65+) (1 - PCV) Never done    Colorectal Cancer Screening  Never done    Shingles Vaccine (1 of 2) Never done    Abdominal Aortic Aneurysm Screening  Never done    Influenza Vaccine (1) Never done

## 2023-09-13 ENCOUNTER — PATIENT OUTREACH (OUTPATIENT)
Dept: ADMINISTRATIVE | Facility: HOSPITAL | Age: 73
End: 2023-09-13
Payer: MEDICARE

## 2023-09-13 NOTE — PROGRESS NOTES
Attempted to call patient to verify Dr. Bauer as his PCP. Number on chart either has been changed or disconnected.   Patient has not been seen since 2019.    Dr. Bauer removed as PCP.

## 2023-10-27 ENCOUNTER — OFFICE VISIT (OUTPATIENT)
Dept: PRIMARY CARE CLINIC | Facility: CLINIC | Age: 73
End: 2023-10-27
Payer: MEDICARE

## 2023-10-27 VITALS
TEMPERATURE: 97 F | SYSTOLIC BLOOD PRESSURE: 118 MMHG | OXYGEN SATURATION: 99 % | DIASTOLIC BLOOD PRESSURE: 78 MMHG | RESPIRATION RATE: 18 BRPM | HEIGHT: 66 IN | BODY MASS INDEX: 18.3 KG/M2 | HEART RATE: 66 BPM | WEIGHT: 113.88 LBS

## 2023-10-27 DIAGNOSIS — Z87.891 FORMER SMOKER, STOPPED SMOKING IN DISTANT PAST: ICD-10-CM

## 2023-10-27 DIAGNOSIS — Z86.711 HISTORY OF PULMONARY EMBOLUS (PE): ICD-10-CM

## 2023-10-27 DIAGNOSIS — Z23 NEED FOR VACCINATION: ICD-10-CM

## 2023-10-27 DIAGNOSIS — C61 PROSTATE CANCER: ICD-10-CM

## 2023-10-27 DIAGNOSIS — Z12.11 COLON CANCER SCREENING: ICD-10-CM

## 2023-10-27 DIAGNOSIS — Z76.89 ENCOUNTER TO ESTABLISH CARE: Primary | ICD-10-CM

## 2023-10-27 PROCEDURE — 90694 FLU VACCINE - QUADRIVALENT - ADJUVANTED: ICD-10-PCS | Mod: S$GLB,,, | Performed by: STUDENT IN AN ORGANIZED HEALTH CARE EDUCATION/TRAINING PROGRAM

## 2023-10-27 PROCEDURE — 3078F DIAST BP <80 MM HG: CPT | Mod: CPTII,S$GLB,, | Performed by: STUDENT IN AN ORGANIZED HEALTH CARE EDUCATION/TRAINING PROGRAM

## 2023-10-27 PROCEDURE — 90677 PCV20 VACCINE IM: CPT | Mod: S$GLB,,, | Performed by: STUDENT IN AN ORGANIZED HEALTH CARE EDUCATION/TRAINING PROGRAM

## 2023-10-27 PROCEDURE — 3288F PR FALLS RISK ASSESSMENT DOCUMENTED: ICD-10-PCS | Mod: CPTII,S$GLB,, | Performed by: STUDENT IN AN ORGANIZED HEALTH CARE EDUCATION/TRAINING PROGRAM

## 2023-10-27 PROCEDURE — G0008 FLU VACCINE - QUADRIVALENT - ADJUVANTED: ICD-10-PCS | Mod: S$GLB,,, | Performed by: STUDENT IN AN ORGANIZED HEALTH CARE EDUCATION/TRAINING PROGRAM

## 2023-10-27 PROCEDURE — 99214 OFFICE O/P EST MOD 30 MIN: CPT | Mod: 25,S$GLB,, | Performed by: STUDENT IN AN ORGANIZED HEALTH CARE EDUCATION/TRAINING PROGRAM

## 2023-10-27 PROCEDURE — 1101F PT FALLS ASSESS-DOCD LE1/YR: CPT | Mod: CPTII,S$GLB,, | Performed by: STUDENT IN AN ORGANIZED HEALTH CARE EDUCATION/TRAINING PROGRAM

## 2023-10-27 PROCEDURE — G0009 ADMIN PNEUMOCOCCAL VACCINE: HCPCS | Mod: S$GLB,,, | Performed by: STUDENT IN AN ORGANIZED HEALTH CARE EDUCATION/TRAINING PROGRAM

## 2023-10-27 PROCEDURE — G0009 PNEUMOCOCCAL CONJUGATE VACCINE 20-VALENT: ICD-10-PCS | Mod: S$GLB,,, | Performed by: STUDENT IN AN ORGANIZED HEALTH CARE EDUCATION/TRAINING PROGRAM

## 2023-10-27 PROCEDURE — 1160F PR REVIEW ALL MEDS BY PRESCRIBER/CLIN PHARMACIST DOCUMENTED: ICD-10-PCS | Mod: CPTII,S$GLB,, | Performed by: STUDENT IN AN ORGANIZED HEALTH CARE EDUCATION/TRAINING PROGRAM

## 2023-10-27 PROCEDURE — 99214 PR OFFICE/OUTPT VISIT, EST, LEVL IV, 30-39 MIN: ICD-10-PCS | Mod: 25,S$GLB,, | Performed by: STUDENT IN AN ORGANIZED HEALTH CARE EDUCATION/TRAINING PROGRAM

## 2023-10-27 PROCEDURE — 90677 PNEUMOCOCCAL CONJUGATE VACCINE 20-VALENT: ICD-10-PCS | Mod: S$GLB,,, | Performed by: STUDENT IN AN ORGANIZED HEALTH CARE EDUCATION/TRAINING PROGRAM

## 2023-10-27 PROCEDURE — 90694 VACC AIIV4 NO PRSRV 0.5ML IM: CPT | Mod: S$GLB,,, | Performed by: STUDENT IN AN ORGANIZED HEALTH CARE EDUCATION/TRAINING PROGRAM

## 2023-10-27 PROCEDURE — 3078F PR MOST RECENT DIASTOLIC BLOOD PRESSURE < 80 MM HG: ICD-10-PCS | Mod: CPTII,S$GLB,, | Performed by: STUDENT IN AN ORGANIZED HEALTH CARE EDUCATION/TRAINING PROGRAM

## 2023-10-27 PROCEDURE — 1159F PR MEDICATION LIST DOCUMENTED IN MEDICAL RECORD: ICD-10-PCS | Mod: CPTII,S$GLB,, | Performed by: STUDENT IN AN ORGANIZED HEALTH CARE EDUCATION/TRAINING PROGRAM

## 2023-10-27 PROCEDURE — 99999 PR PBB SHADOW E&M-EST. PATIENT-LVL IV: ICD-10-PCS | Mod: PBBFAC,,, | Performed by: STUDENT IN AN ORGANIZED HEALTH CARE EDUCATION/TRAINING PROGRAM

## 2023-10-27 PROCEDURE — G0008 ADMIN INFLUENZA VIRUS VAC: HCPCS | Mod: S$GLB,,, | Performed by: STUDENT IN AN ORGANIZED HEALTH CARE EDUCATION/TRAINING PROGRAM

## 2023-10-27 PROCEDURE — 3074F SYST BP LT 130 MM HG: CPT | Mod: CPTII,S$GLB,, | Performed by: STUDENT IN AN ORGANIZED HEALTH CARE EDUCATION/TRAINING PROGRAM

## 2023-10-27 PROCEDURE — 1101F PR PT FALLS ASSESS DOC 0-1 FALLS W/OUT INJ PAST YR: ICD-10-PCS | Mod: CPTII,S$GLB,, | Performed by: STUDENT IN AN ORGANIZED HEALTH CARE EDUCATION/TRAINING PROGRAM

## 2023-10-27 PROCEDURE — 1126F PR PAIN SEVERITY QUANTIFIED, NO PAIN PRESENT: ICD-10-PCS | Mod: CPTII,S$GLB,, | Performed by: STUDENT IN AN ORGANIZED HEALTH CARE EDUCATION/TRAINING PROGRAM

## 2023-10-27 PROCEDURE — 99999 PR PBB SHADOW E&M-EST. PATIENT-LVL IV: CPT | Mod: PBBFAC,,, | Performed by: STUDENT IN AN ORGANIZED HEALTH CARE EDUCATION/TRAINING PROGRAM

## 2023-10-27 PROCEDURE — 3288F FALL RISK ASSESSMENT DOCD: CPT | Mod: CPTII,S$GLB,, | Performed by: STUDENT IN AN ORGANIZED HEALTH CARE EDUCATION/TRAINING PROGRAM

## 2023-10-27 PROCEDURE — 1159F MED LIST DOCD IN RCRD: CPT | Mod: CPTII,S$GLB,, | Performed by: STUDENT IN AN ORGANIZED HEALTH CARE EDUCATION/TRAINING PROGRAM

## 2023-10-27 PROCEDURE — 3074F PR MOST RECENT SYSTOLIC BLOOD PRESSURE < 130 MM HG: ICD-10-PCS | Mod: CPTII,S$GLB,, | Performed by: STUDENT IN AN ORGANIZED HEALTH CARE EDUCATION/TRAINING PROGRAM

## 2023-10-27 PROCEDURE — 1160F RVW MEDS BY RX/DR IN RCRD: CPT | Mod: CPTII,S$GLB,, | Performed by: STUDENT IN AN ORGANIZED HEALTH CARE EDUCATION/TRAINING PROGRAM

## 2023-10-27 PROCEDURE — 1126F AMNT PAIN NOTED NONE PRSNT: CPT | Mod: CPTII,S$GLB,, | Performed by: STUDENT IN AN ORGANIZED HEALTH CARE EDUCATION/TRAINING PROGRAM
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
## 2023-10-27 NOTE — PROGRESS NOTES
Identified pt. By name and   Administered high dose flu vaccine and prevnar 20 vaccine using aseptic technique

## 2024-01-11 ENCOUNTER — TELEPHONE (OUTPATIENT)
Dept: SURGERY | Facility: CLINIC | Age: 74
End: 2024-01-11
Payer: MEDICARE

## 2024-01-11 ENCOUNTER — PATIENT OUTREACH (OUTPATIENT)
Dept: ADMINISTRATIVE | Facility: HOSPITAL | Age: 74
End: 2024-01-11
Payer: MEDICARE

## 2024-01-11 RX ORDER — SODIUM, POTASSIUM,MAG SULFATES 17.5-3.13G
1 SOLUTION, RECONSTITUTED, ORAL ORAL DAILY
Qty: 1 KIT | Refills: 0 | Status: SHIPPED | OUTPATIENT
Start: 2024-01-11 | End: 2024-01-13

## 2024-01-11 NOTE — TELEPHONE ENCOUNTER
The patient has been advised the Colonoscopy Prep Kit will be ordered from the patient's verified preferred pharmacy on file. The medication can  be picked up by the patient, or the patient's designated representative.The patient was further explained the Colonoscopy Prep instructions will be mailed to the patient verified mailing address on file, or put onto the [facility] portal, whichever method of delivery the patient prefers.Additionally this patient was informed,not to follow the instructions that comes with the bowel prep medication. However, the patient was instructed to please follow the Colonoscopy Bowel Prep instructions that's being provided by the . The patient was asked to please to follow the Colonoscopy Prep instructions being provided as precisely,and  meticulously as possible.The patient was advised you  will receive a follow up phone call to summarize the Colonoscopy Prep instructions prior to the scheduled Colonoscopy procedure date. At this time the patient will be given an opportunity to ask any questions regarding the Colonoscopy procedure, and it's associated Bowel Prep instructions.

## 2024-01-11 NOTE — TELEPHONE ENCOUNTER
Called patient in reference to a referral to Colorectal Surgery for colon cancer screening. Patient verbally consented to a Colonoscopy and requested to be scheduled for a Colonoscopy on 01/31/2024 Patient was advised a designated  is required on the day of the Colonoscopy to drive the patient home and the  must be at least. 18 years old.Colonoscopy Prep instructions were thoroughly explained and discussed with the patient.It was emphasized, and reiterated to the patient, to please not to follow the bowel prep instructions that comes with the bowel prep package.However, to please follow the prep instructions that will be received in the mail,or via the [facility] portal, or by both modes of delivery, which ever method of delivery the patient prefers,from the [facility]   Patient acknowledges understanding Prep instructions as explained and discussed on the phone.. Patient was advised the Colonoscopy Prep instructions discussed and explained on the phone,are being mailed out to the patient's verified address on file,or put onto the [facility] portal,or both methods of delivery, whichever the patient prefers. Patient's address on file was verified with the patient for accuracy of mailing. Patient's medications on file was reviewed with the patient for accuracy of information. Patient denies taking  any other medications other than those listed and verified on medication profile.Patient was explained the Colonoscopy will be performed here at [facility]. Patient was further explained the Pre-Op will call one day prior to the procedure date, to discuss Pre-Op instructions;and what time to report for the Colonoscopy. The patient was given the opportunity to ask any questions about the Colonoscopy. No further issues were discussed.

## 2024-01-11 NOTE — PROGRESS NOTES
Health Maintenance Due   Topic Date Due    Shingles Vaccine (1 of 2) Never done    RSV Vaccine (Age 60+ and Pregnant patients) (1 - 1-dose 60+ series) Never done    COVID-19 Vaccine (2023-24 season) 2023    Colorectal Cancer Screening  Never done     Population Health Chart Review & Patient Outreach Details      Further Action Needed If Patient Returns Outreach:      MA Gap Report  reviewed. Patient overdue for colon cancer screening. Case request for colonoscopy placed by PCP on 10/27/2023. Staff message sent to Colorectal Department in regards to calling patient to schedule.       Updates Requested / Reviewed:     [x]  Care Everywhere    []     []  External Sources ([facility], [facility], DIS, etc.)    [] [facility]   [] [facility]   [] Other:    [x]  Care Team Updated   []  Removed  or Duplicate Orders   [x]  Immunization Reconciliation Completed / Queried    [x] [place]   [] [place]   [] [place]   [] [place]      Health Maintenance Topics Addressed and Outreach Outcomes / Actions Taken:             Breast Cancer Screening []  Mammogram Order Placed    []  Mammogram Screening Scheduled    []  External Records Requested & Care Team Updated if Applicable    []  External Records Uploaded & Care Team Updated if Applicable    []  Pt Declined Scheduling Mammogram    []  Pt Will Schedule with External Provider / Order Routed & Care Team Updated if Applicable              Cervical Cancer Screening []  Pap Smear Scheduled in Primary Care or OBGYN    []  External Records Requested & Care Team Updated if Applicable       []  External Records Uploaded, Care Team Updated, & History Updated if Applicable    []  Patient Declined Scheduling Pap Smear    []  Patient Will Schedule with External Provider & Care Team Updated if Applicable                  Colorectal Cancer Screening [x]  Colonoscopy Case Request / Referral / Home Test Order Placed    []  External Records Requested & Care Team Updated if  Applicable    []  External Records Uploaded, Care Team Updated, & History Updated if Applicable    []  Patient Declined Completing Colon Cancer Screening    []  Patient Will Schedule with External Provider & Care Team Updated if Applicable    []  Fit Kit Mailed (add the SmartPhrase under additional notes)    []  Reminded Patient to Complete Home Test                Diabetic Eye Exam []  Eye Exam Screening Order Placed    []  Eye Camera Scheduled or Optometry/Ophthalmology Referral Placed    []  External Records Requested & Care Team Updated if Applicable    []  External Records Uploaded, Care Team Updated, & History Updated if Applicable    []  Patient Declined Scheduling Eye Exam    []  Patient Will Schedule with External Provider & Care Team Updated if Applicable             Blood Pressure Control []  Primary Care Follow Up Visit Scheduled     []  Remote Blood Pressure Reading Captured    []  Patient Declined Remote Reading or Scheduling Appt - Escalated to PCP    []  Patient Will Call Back or Send Portal Message with Reading                 HbA1c & Other Labs []  Overdue Lab(s) Ordered    []  Overdue Lab(s) Scheduled    []  External Records Uploaded & Care Team Updated if Applicable    []  Primary Care Follow Up Visit Scheduled     []  Reminded Patient to Complete A1c Home Test    []  Patient Declined Scheduling Labs or Will Call Back to Schedule    []  Patient Will Schedule with External Provider / Order Routed, & Care Team Updated if Applicable           Primary Care Appointment []  Primary Care Appt Scheduled    []  Patient Declined Scheduling or Will Call Back to Schedule    []  Pt Established with External Provider, Updated Care Team, & Informed Pt to Notify Payor if Applicable           Medication Adherence /    Statin Use []  Primary Care Appointment Scheduled    []  Patient Reminded to  Prescription    []  Patient Declined, Provider Notified if Needed    []  Sent Provider Message to Review to  Evaluate Pt for Statin, Add Exclusion Dx Codes, Document   Exclusion in Problem List, Change Statin Intensity Level to Moderate or High Intensity if Applicable                Osteoporosis Screening []  Dexa Order Placed    []  Dexa Appointment Scheduled    []  External Records Requested & Care Team Updated    []  External Records Uploaded, Care Team Updated, & History Updated if Applicable    []  Patient Declined Scheduling Dexa or Will Call Back to Schedule    []  Patient Will Schedule with External Provider / Order Routed & Care Team Updated if Applicable       Additional Notes:

## 2024-02-05 ENCOUNTER — TELEPHONE (OUTPATIENT)
Dept: GASTROENTEROLOGY | Facility: CLINIC | Age: 74
End: 2024-02-05
Payer: MEDICARE

## 2024-02-05 NOTE — TELEPHONE ENCOUNTER
-Patient's wife was given the results of his colonoscopy.      ---- Message from Robert Nuñez MD sent at 2/3/2024  2:47 PM CST -----  Please inform  Jorge Bourne,    The colon polyp(s) were benign polyps(s) called adenomatous polyp. This is a precancerous polyp. It does not contain cancer, but has the potential to turn into cancer over time. This polyp has been removed but you may develop more polyps in the future. It is important to keep your previously recommended interval for repeat colonoscopy - 3 years.    Let us know if you have questions.

## 2024-04-23 ENCOUNTER — TELEPHONE (OUTPATIENT)
Dept: PULMONOLOGY | Facility: CLINIC | Age: 74
End: 2024-04-23
Payer: MEDICARE

## 2024-04-23 ENCOUNTER — TELEPHONE (OUTPATIENT)
Dept: PRIMARY CARE CLINIC | Facility: CLINIC | Age: 74
End: 2024-04-23
Payer: MEDICARE

## 2024-04-23 NOTE — TELEPHONE ENCOUNTER
Call was returned to patient wife (Mrs Bourne)  in regards to scheduling an appointment. Patient is scheduled on 4/25/24 at 3:30 PM with Dr Pagan. She confirmed and verbalized understanding.

## 2024-04-23 NOTE — TELEPHONE ENCOUNTER
----- Message from Hossein Olivas MD sent at 4/23/2024  8:12 AM CDT -----  Please schedule patient for ED follow-up visit    Hossein Olivas MD

## 2024-04-23 NOTE — TELEPHONE ENCOUNTER
----- Message from Ariela Puente sent at 4/23/2024  9:21 AM CDT -----  Regarding: appt /referral  Contact: [phone]  Pt wife  is calling to make a appointment from a referral for R91.8 (ICD-10-CM) no available dates per wife needed to be seen within 2 days  - Mass of left lung .....Please call and adv @ [phone]

## 2024-04-25 ENCOUNTER — OFFICE VISIT (OUTPATIENT)
Dept: PULMONOLOGY | Facility: CLINIC | Age: 74
End: 2024-04-25
Payer: MEDICARE

## 2024-04-25 VITALS
DIASTOLIC BLOOD PRESSURE: 72 MMHG | HEIGHT: 66 IN | HEART RATE: 80 BPM | BODY MASS INDEX: 19.6 KG/M2 | OXYGEN SATURATION: 98 % | SYSTOLIC BLOOD PRESSURE: 120 MMHG | WEIGHT: 121.94 LBS

## 2024-04-25 DIAGNOSIS — Z87.891 HISTORY OF TOBACCO USE: ICD-10-CM

## 2024-04-25 DIAGNOSIS — R91.1 PULMONARY NODULE 1 CM OR GREATER IN DIAMETER: Primary | ICD-10-CM

## 2024-04-25 PROCEDURE — 99204 OFFICE O/P NEW MOD 45 MIN: CPT | Mod: S$GLB,,, | Performed by: EMERGENCY MEDICINE

## 2024-04-25 PROCEDURE — 99999 PR PBB SHADOW E&M-EST. PATIENT-LVL IV: CPT | Mod: PBBFAC,,, | Performed by: EMERGENCY MEDICINE

## 2024-04-25 PROCEDURE — 99214 OFFICE O/P EST MOD 30 MIN: CPT | Mod: PBBFAC | Performed by: EMERGENCY MEDICINE

## 2024-04-25 NOTE — PROGRESS NOTES
Pulmonary & Critical Care Medicine   Consultation Note    Reason for Consultation: Abnormal CT Chest     HPI: 74-year-old male with a history of prostate cancer, seizures.. Presented to ED with left sided chest pain- Had CT imaging chest with notable mass. To me today for evaluation. 2.8 left pulmonary nodule identified. No old imaging for comparison.   No seizures in years. Off meds.   States had acute left sided pain associated with congestion and sputum production prompting ED visit.   On abx (has taken 3 day)- all symptoms now resolved.   No LOPEZ/orthopnea/PND/chest pain  Denies breathing issues. Feels fine.   Gained some weight   No additional complaints.     Additional Pulmonary History:   Occupational/Environmental Exposures:From [place]. Lives in [place]. No home changes. No construction/renovations.   . Now retired..   Exposure to Animals/Pets: None   Travel History: None   History of exposures to TB: None   Family History of Lung Cancer: None   Tobacco= Quit 10 years. 50 years x 2 pk/day   Childhood history of Lung Disease:None   OAC/Anti-PLT- None       Past Medical History:   Diagnosis Date    Cancer     prostate    Seizures      Past Surgical History:   Procedure Laterality Date    BRAIN SURGERY      COLONOSCOPY N/A 1/31/2024    Procedure: COLONOSCOPY;  Surgeon: Robert Nuñez MD;  Location: [facility];  Service: Endoscopy;  Laterality: N/A;    COLONOSCOPY W/ POLYPECTOMY  01/31/2024    5 poylps; large with 3 clips    FRACTURE SURGERY      left femur hardware    PROSTATE SURGERY       Social History:   Social History     Socioeconomic History    Marital status:    Tobacco Use    Smoking status: Former     Types: Cigarettes     Start date: 2018   Substance and Sexual Activity    Alcohol use: Yes     Comment: every now and then    Drug use: No     Family History   Problem Relation Name Age of Onset    No Known Problems Mother      No Known Problems Father       Drug  "Allergies:   Review of patient's allergies indicates:  No Known Allergies  Current Infusions:  Current Facility-Administered Medications   Medication Dose Route Frequency Last Rate Last Admin       Review of Systems:   A comprehensive 12-point review of systems was performed, and is negative except for those items mentioned above in the HPI section of this note.     Vital Signs:    /72 (BP Location: Right arm, Patient Position: Sitting, BP Method: Medium (Manual))   Pulse 80   Ht 5' 6" (1.676 m)   Wt 55.3 kg (121 lb 14.6 oz)   SpO2 98%   BMI 19.68 kg/m²      Physical Exam:   GEN- NAD AAOx3 Not toxic. Thin   HEENT- ATNC, PERRLA, EOMI, OP-Cl. No JVD, LAD or bruit noted. Trachea Midline.   CV- RRR No M/R/G  RESP- CTA-Bilateral   GI- S/NT/ND. Positive BS X 4. No HSM Noted   Ext- MAEW, No deformity. No edema or rashes noted.         Personal Review and Summary of Prior Diagnostics    Laboratory Studies: Reviewed      Latest Reference Range & Units Most Recent   WBC 3.90 - 12.70 K/uL 8.77  4/22/24 11:55   RBC 4.60 - 6.20 M/uL 4.52 (L)  4/22/24 11:55   Hemoglobin 14.0 - 18.0 g/dL 13.4 (L)  4/22/24 11:55   Hematocrit 40.0 - 54.0 % 42.3  4/22/24 11:55   MCV 82 - 98 fL 94  4/22/24 11:55   MCH 27.0 - 31.0 pg 29.6  4/22/24 11:55   MCHC 32.0 - 36.0 g/dL 31.7 (L)  4/22/24 11:55   RDW 11.5 - 14.5 % 14.1  4/22/24 11:55   Platelet Count 150 - 450 K/uL 205  4/22/24 11:55   MPV 9.2 - 12.9 fL 10.9  4/22/24 11:55   Platelet Estimate Normal  Normal  12/14/09 04:35   Gran % 38.0 - 73.0 % 71.2  4/22/24 11:55   Lymph % 18.0 - 48.0 % 21.7  4/22/24 11:55   Lymphs 25 - 40 % 7 (L)  12/14/09 04:35   Mono % 4.0 - 15.0 % 5.7  4/22/24 11:55   Monocytes 0 - 10 % 4  12/14/09 04:35   Eos % 0.0 - 8.0 % 0.6  4/22/24 11:55   Eosinophils 0 - 8 % 1  12/14/09 04:35   Basophil % 0.0 - 1.9 % 0.5  4/22/24 11:55   Immature Granulocytes 0.0 - 0.5 % 0.3  4/22/24 11:55   Gran # (ANC) 1.8 - 7.7 K/uL 6.3  4/22/24 11:55   Lymph # 1.0 - 4.8 K/uL " 1.9  4/22/24 11:55   Mono # 0.3 - 1.0 K/uL 0.5  4/22/24 11:55   Eos # 0.0 - 0.5 K/uL 0.1  4/22/24 11:55   Baso # 0.00 - 0.20 K/uL 0.04  4/22/24 11:55   Immature Grans (Abs) 0.00 - 0.04 K/uL 0.03  4/22/24 11:55   SEGS 50 - 70 % 88 (H)  12/14/09 04:35   Bands 0 - 6 % 5  12/12/09 05:20   nRBC 0 /100 WBC 0  4/22/24 11:55   Differential Method  Automated  4/22/24 11:55   Aniso  sl  12/14/09 04:35   Poikilocytosis  sl  12/14/09 04:35   Poly  sl  12/14/09 04:35   Iron 45 - 160 ug/dL 68  4/22/19 10:23   TIBC 250 - 450 ug/dL 296  4/22/19 10:23   Saturated Iron 20 - 50 % 23  4/22/19 10:23   Transferrin 200 - 375 mg/dL 200  4/22/19 10:23   Folate >=6.59 ng/mL 3.23 (L)  4/22/19 10:23   Vitamin B12 180 - 914 pg/mL 168 (L)  4/22/19 10:23   PT 9.0 - 12.5 sec 11.1  1/21/15 20:05   INR 0.8 - 1.2  1.1  1/21/15 20:05   D-Dimer <0.50 mg/L FEU 3.55 (H)  4/22/24 16:44   Sodium 136 - 145 mmol/L 139  4/22/24 11:56   Potassium 3.5 - 5.1 mmol/L 4.4  4/22/24 11:56   Chloride 95 - 110 mmol/L 103  4/22/24 11:56   CO2 23 - 29 mmol/L 21 (L)  4/22/24 11:56   Anion Gap 8 - 16 mmol/L 15  4/22/24 11:56   BUN 8 - 23 mg/dL 11  4/22/24 11:56   Creatinine 0.5 - 1.4 mg/dL 0.9  4/22/24 11:56   eGFR >60 mL/min/1.73 m^2 >60.0  4/22/24 11:56   eGFR if non African American >60 mL/min/1.73 m^2  >60 mL/min/1.73 m^2 >60.0  3/21/19 10:08  >60.0  3/21/19 10:08   eGFR if African American >60 mL/min/1.73 m^2  >60 mL/min/1.73 m^2 >60.0  3/21/19 10:08  >60.0  3/21/19 10:08   Glucose 70 - 110 mg/dL 87  4/22/24 11:56   Calcium 8.7 - 10.5 mg/dL 10.2  4/22/24 11:56   Phosphorus Level 2.7 - 4.5 mg/dL 2.6 (L)  1/21/15 20:05   Magnesium  1.6 - 2.6 mg/dL 1.9  3/21/19 10:08   ALP 55 - 135 U/L 124  4/22/24 11:56   PROTEIN TOTAL 6.0 - 8.4 g/dL 9.4 (H)  4/22/24 11:56   Albumin 3.5 - 5.2 g/dL 3.9  4/22/24 11:56   BILIRUBIN TOTAL 0.1 - 1.0 mg/dL 0.5  4/22/24 11:56   AST 10 - 40 U/L 14  4/22/24 11:56   ALT 10 - 44 U/L 9 (L)  4/22/24 11:56   Cholesterol Total 80 - 200 mg/dL  184  3/21/19 10:08   HDL 40 - 75 mg/dL 36 (L)  3/21/19 10:08   HDL/Cholesterol Ratio 20.0 - 50.0 % 19.6 (L)  3/21/19 10:08   Non-HDL Cholesterol mg/dL 148  3/21/19 10:08   Total Cholesterol/HDL Ratio 2.0 - 5.0  5.1 (H)  3/21/19 10:08   Triglycerides 30 - 150 mg/dL 99  3/21/19 10:08   LDL Cholesterol <100 mg/dL 128 (H)  3/21/19 10:08   BNP 0 - 99 pg/mL 29  4/22/24 11:55   Troponin I 0.000 - 0.026 ng/mL <0.006  4/22/24 14:49   TSH 0.45 - 5.33 uIU/mL 2.29  2/6/19 09:17   Free T4 0.61 - 1.12 ng/dL 1.09  2/6/19 09:17   Prostate Specific Antigen 0 - 4.0 ng/ml 4.8 (H)  2/22/10 10:00   Gentamicin, Random ug/ml 0.4  12/14/09 04:35   Gentamicin, Trough 0.5 - 2.0 ug/ml 0.3 (L)  12/13/09 05:20   Phenytoin Level Total 10.0 - 20.0 ug/ml 4.8 (L)  12/10/09 16:28   RAPID STREP A SCREEN Negative  Negative  2/22/24 14:39   SARS-CoV-2 RNA, Amplification, Qual Negative  Negative  2/22/24 14:39   Specimen UA  Urine, Catheterized  1/21/15 20:54   Color, UA Yellow, Straw, [place]y  Yellow  1/21/15 20:54   Appearance, UA Clear  Cloudy !  1/21/15 20:54   Specific Gravity, UA 1.005 - 1.030  1.015  1/21/15 20:54   pH, UA 5.0 - 8.0  6.0  1/21/15 20:54   Protein, UA Negative  3+ !  1/21/15 20:54   Glucose, UA Negative  Negative  1/21/15 20:54   Ketones, UA Negative  1+ !  1/21/15 20:54   Blood, UA Negative  2+ !  1/21/15 20:54   NITRITE UA Negative  Negative  1/21/15 20:54   UROBILINOGEN UA <2.0 EU/dL Negative  1/21/15 20:54   Bilirubin (UA) Negative  Negative  1/21/15 20:54   Leukocyte Esterase, UA Negative  3+ !  1/21/15 20:54   RBC, UA 0 - 4 /hpf 5 (H)  1/21/15 20:54   WBC, UA 0 - 5 /hpf >100 (H)  1/21/15 20:54   Bacteria, UA None-Occ /hpf Many !  1/21/15 20:54   Squam Epithel, UA /hpf 2  1/19/15 15:14   WBC Clumps, UA None-Rare  Rare  1/19/15 15:14   Hyaline Casts, UA 0-1/lpf /lpf 0  1/21/15 20:54   Ca Oxalate Renetta, UA Few-Mod  OCC  2/2/09 15:18       Microbiology Data: Reviewed     Summary of Chest Imaging Personally Reviewed:     CTA Chest-    Lungs/Pleura: There are moderate emphysematous changes. There is an opacity in the left lower lobe measuring 2.8 x 1.7 x 1.8 cm (series 5, image 172). There is bandlike atelectasis in the bilateral lower lobes. There is a small left pleural effusion.           2D Echo: None     PFT's: None     Assessment:     No diagnosis found.     Outpatient Encounter Medications as of 4/25/2024   Medication Sig Dispense Refill    aspirin (ECOTRIN) 81 MG EC tablet Take 81 mg by mouth once daily.      atorvastatin (LIPITOR) 10 MG tablet Take 1 tablet (10 mg total) by mouth once daily. 90 tablet 3    cetirizine (ZYRTEC) 10 MG tablet Take 1 tablet (10 mg total) by mouth once daily. 30 tablet 0    doxycycline (VIBRAMYCIN) 100 MG Cap Take 1 capsule (100 mg total) by mouth 2 (two) times daily. for 7 days 14 capsule 0    folic acid (FOLVITE) 1 MG tablet Take 1 tablet (1 mg total) by mouth once daily. 90 tablet 3     Facility-Administered Encounter Medications as of 4/25/2024   Medication Dose Route Frequency Provider Last Rate Last Admin    0.9%  NaCl infusion   Intravenous Continuous Robert Nuñez MD        cyanocobalamin injection 1,000 mcg  1,000 mcg Intramuscular Q7 Days René Bauer MD   1,000 mcg at 07/29/19 0856     No orders of the defined types were placed in this encounter.      Plan:     Pulmonary nodule- 2.8 cm in LLL noted on CTA in ED after presentation for left sided chest pain. Now asymptomatic. On doxy. No congestion. No respiratory issues. Rounded PNA vs. Malignancy. Amenable to robotic approach     -- Plan for repeat CT chest in 3-4 weeks for resolution given acute symptoms. If persistent needs biopsy   -- Case request has been placed for 5/31 (MARIA L + EBUS). Cyto + cultures   -- No OAC/Anti-PLT   -- Complete course of Abx as directed.     2. History of seizure- none in years. No AEDs     Discussed procedure in detail with patient and wife. Risks including bleeding, PTX, respiratory failure, non-diagnostic  sample, need for additional procedures and numerous additional potential complications up to death outlined. He verbalized understanding and all questions answered to their satisfaction. Written consent signed and on chart.     Torres Pagan MD   [facility]

## 2024-05-24 ENCOUNTER — HOSPITAL ENCOUNTER (OUTPATIENT)
Dept: RADIOLOGY | Facility: HOSPITAL | Age: 74
Discharge: HOME | End: 2024-05-24
Attending: EMERGENCY MEDICINE
Payer: MEDICARE

## 2024-05-24 DIAGNOSIS — R91.1 PULMONARY NODULE 1 CM OR GREATER IN DIAMETER: ICD-10-CM

## 2024-05-24 PROCEDURE — 71250 CT THORAX DX C-: CPT | Mod: TC

## 2024-05-24 PROCEDURE — 71250 CT THORAX DX C-: CPT | Mod: 26,,, | Performed by: RADIOLOGY

## 2024-05-28 ENCOUNTER — TELEPHONE (OUTPATIENT)
Dept: PULMONOLOGY | Facility: CLINIC | Age: 74
End: 2024-05-28
Payer: MEDICARE

## 2024-05-28 NOTE — TELEPHONE ENCOUNTER
Called patient to confirm bronchoscopy/EBUS/robotic procedure scheduled for Friday 5/31/24, left message on home and cell numbers to return call at [phone].

## 2024-05-31 ENCOUNTER — TELEPHONE (OUTPATIENT)
Dept: PULMONOLOGY | Facility: CLINIC | Age: 74
End: 2024-05-31
Payer: MEDICARE

## 2024-05-31 PROCEDURE — G0179 MD RECERTIFICATION HHA PT: HCPCS | Mod: ,,, | Performed by: FAMILY MEDICINE

## 2024-05-31 NOTE — TELEPHONE ENCOUNTER
----- Message from Josefa Corrales sent at 5/30/2024  5:39 PM CDT -----  Type:  Patient Returning Call    Who Called:pt's wife  Who Left Message for Patient:  Does the patient know what this is regarding?:procedure  Would the patient rather a call back or a response via [facility]? call  Best Call Back Number:[phone] or [phone]  Additional Information: wife states she needs to cancel procedure for tomorrow because she was given no notice in regards to him needing the procedure or not.

## 2024-05-31 NOTE — TELEPHONE ENCOUNTER
I spoke with patient's daughter, Damaso Corralse. She stated they were not sure if the procedure was needed and it would have been difficult to get here this morning. I explained to her I would send a message to Dr Pagan for review and to advise. However, Mr Borune saw Dr Pagan on 4/25/24 and that is when his procedure was scheduled for today and he completed his ct scan for the procedure on 5/24/24. Once Dr Pagan advises me I can follow up with Damaso. She verbalized understanding.

## 2024-07-30 PROCEDURE — G0179 MD RECERTIFICATION HHA PT: HCPCS | Mod: ,,, | Performed by: FAMILY MEDICINE

## 2024-07-31 ENCOUNTER — OFFICE VISIT (OUTPATIENT)
Dept: PULMONOLOGY | Facility: CLINIC | Age: 74
End: 2024-07-31
Payer: MEDICARE

## 2024-07-31 VITALS
HEIGHT: 66 IN | WEIGHT: 122.38 LBS | DIASTOLIC BLOOD PRESSURE: 62 MMHG | HEART RATE: 57 BPM | SYSTOLIC BLOOD PRESSURE: 120 MMHG | OXYGEN SATURATION: 97 % | BODY MASS INDEX: 19.67 KG/M2

## 2024-07-31 DIAGNOSIS — Z87.891 HISTORY OF TOBACCO USE: ICD-10-CM

## 2024-07-31 DIAGNOSIS — R91.1 LUNG NODULE: Primary | ICD-10-CM

## 2024-07-31 PROCEDURE — 99999 PR PBB SHADOW E&M-EST. PATIENT-LVL IV: CPT | Mod: PBBFAC,,, | Performed by: EMERGENCY MEDICINE

## 2024-07-31 NOTE — PROGRESS NOTES
Pulmonary & Critical Care Medicine   Consultation Note     Reason for Consultation: Abnormal CT Chest      HPI: 74-year-old male with a history of prostate cancer, seizures.. Presented to ED with left sided chest pain- Had CT imaging chest with notable mass. To me today for evaluation. 2.8 left pulmonary nodule identified. No old imaging for comparison.   No seizures in years. Off meds.   States had acute left sided pain associated with congestion and sputum production prompting ED visit.   On abx (has taken 3 day)- all symptoms now resolved.   No LOPEZ/orthopnea/PND/chest pain  Denies breathing issues. Feels fine.   Gained some weight   No additional complaints.      Additional Pulmonary History:   Occupational/Environmental Exposures:From [place]. Lives in [place]. No home changes. No construction/renovations.   . Now retired..   Exposure to Animals/Pets: None   Travel History: None   History of exposures to TB: None   Family History of Lung Cancer: None   Tobacco= Quit 10 years. 50 years x 2 pk/day   Childhood history of Lung Disease:None   OAC/Anti-PLT- None         INTERVAl HISTORY- Had repeat CT chest in May.. Was scheduled for robotic bronchoscopy given persistence, but did not show.     -- Daughter present with him in clinic today. Reports lots of issues at home and transportation other issues difficult. They are ready to move forward. He has no new imaging since may available for me to review.   -- From a respiratory standpoint he reports no symptoms. Denies any SOB, LOPEZ, orthopnea, PND. No cough, sputum production or hemoptysis.   -- No OAC/Anti-PLT.   -- History from original visit is otherwise unchanged.   -- Gaining weight.   -- No additional systemic features.            Past Medical History:   Diagnosis Date    Cancer       prostate    Seizures              Past Surgical History:   Procedure Laterality Date    BRAIN SURGERY        COLONOSCOPY N/A 1/31/2024     Procedure:  "COLONOSCOPY;  Surgeon: Robert Nuñez MD;  Location: [facility];  Service: Endoscopy;  Laterality: N/A;    COLONOSCOPY W/ POLYPECTOMY   01/31/2024     5 poylps; large with 3 clips    FRACTURE SURGERY         left femur hardware    PROSTATE SURGERY          Social History:   Social History            Socioeconomic History    Marital status:    Tobacco Use    Smoking status: Former       Types: Cigarettes       Start date: 2018   Substance and Sexual Activity    Alcohol use: Yes       Comment: every now and then    Drug use: No             Family History   Problem Relation Name Age of Onset    No Known Problems Mother        No Known Problems Father          Drug Allergies:   Review of patient's allergies indicates:  No Known Allergies  Current Infusions:          Current Facility-Administered Medications   Medication Dose Route Frequency Last Rate Last Admin         Review of Systems:   A comprehensive 12-point review of systems was performed, and is negative except for those items mentioned above in the HPI section of this note.      Vital Signs:    /62 (BP Location: Right arm, Patient Position: Sitting)   Pulse (!) 57   Ht 5' 6" (1.676 m)   Wt 55.5 kg (122 lb 5.7 oz)   SpO2 97%   BMI 19.75 kg/m²       Physical Exam:   GEN- NAD AAOx3 Not toxic. Thin   HEENT- ATNC, PERRLA, EOMI, OP-Cl. No JVD, LAD or bruit noted. Trachea Midline.   CV- RRR No M/R/G  RESP- CTA-Bilateral   GI- S/NT/ND. Positive BS X 4. No HSM Noted   Ext- MAEW, No deformity. No edema or rashes noted.          Personal Review and Summary of Prior Diagnostics     Laboratory Studies: Reviewed        Latest Reference Range & Units Most Recent   WBC 3.90 - 12.70 K/uL 8.77  4/22/24 11:55   RBC 4.60 - 6.20 M/uL 4.52 (L)  4/22/24 11:55   Hemoglobin 14.0 - 18.0 g/dL 13.4 (L)  4/22/24 11:55   Hematocrit 40.0 - 54.0 % 42.3  4/22/24 11:55   MCV 82 - 98 fL 94  4/22/24 11:55   MCH 27.0 - 31.0 pg 29.6  4/22/24 11:55   MCHC 32.0 - 36.0 g/dL 31.7 " (L)  4/22/24 11:55   RDW 11.5 - 14.5 % 14.1  4/22/24 11:55   Platelet Count 150 - 450 K/uL 205  4/22/24 11:55   MPV 9.2 - 12.9 fL 10.9  4/22/24 11:55   Platelet Estimate Normal  Normal  12/14/09 04:35   Gran % 38.0 - 73.0 % 71.2  4/22/24 11:55   Lymph % 18.0 - 48.0 % 21.7  4/22/24 11:55   Lymphs 25 - 40 % 7 (L)  12/14/09 04:35   Mono % 4.0 - 15.0 % 5.7  4/22/24 11:55   Monocytes 0 - 10 % 4  12/14/09 04:35   Eos % 0.0 - 8.0 % 0.6  4/22/24 11:55   Eosinophils 0 - 8 % 1  12/14/09 04:35   Basophil % 0.0 - 1.9 % 0.5  4/22/24 11:55   Immature Granulocytes 0.0 - 0.5 % 0.3  4/22/24 11:55   Gran # (ANC) 1.8 - 7.7 K/uL 6.3  4/22/24 11:55   Lymph # 1.0 - 4.8 K/uL 1.9  4/22/24 11:55   Mono # 0.3 - 1.0 K/uL 0.5  4/22/24 11:55   Eos # 0.0 - 0.5 K/uL 0.1  4/22/24 11:55   Baso # 0.00 - 0.20 K/uL 0.04  4/22/24 11:55   Immature Grans (Abs) 0.00 - 0.04 K/uL 0.03  4/22/24 11:55   SEGS 50 - 70 % 88 (H)  12/14/09 04:35   Bands 0 - 6 % 5  12/12/09 05:20   nRBC 0 /100 WBC 0  4/22/24 11:55   Differential Method   Automated  4/22/24 11:55   Aniso   sl  12/14/09 04:35   Poikilocytosis   sl  12/14/09 04:35   Poly   sl  12/14/09 04:35   Iron 45 - 160 ug/dL 68  4/22/19 10:23   TIBC 250 - 450 ug/dL 296  4/22/19 10:23   Saturated Iron 20 - 50 % 23  4/22/19 10:23   Transferrin 200 - 375 mg/dL 200  4/22/19 10:23   Folate >=6.59 ng/mL 3.23 (L)  4/22/19 10:23   Vitamin B12 180 - 914 pg/mL 168 (L)  4/22/19 10:23   PT 9.0 - 12.5 sec 11.1  1/21/15 20:05   INR 0.8 - 1.2  1.1  1/21/15 20:05   D-Dimer <0.50 mg/L FEU 3.55 (H)  4/22/24 16:44   Sodium 136 - 145 mmol/L 139  4/22/24 11:56   Potassium 3.5 - 5.1 mmol/L 4.4  4/22/24 11:56   Chloride 95 - 110 mmol/L 103  4/22/24 11:56   CO2 23 - 29 mmol/L 21 (L)  4/22/24 11:56   Anion Gap 8 - 16 mmol/L 15  4/22/24 11:56   BUN 8 - 23 mg/dL 11  4/22/24 11:56   Creatinine 0.5 - 1.4 mg/dL 0.9  4/22/24 11:56   eGFR >60 mL/min/1.73 m^2 >60.0  4/22/24 11:56   eGFR if non African American >60 mL/min/1.73 m^2  >60  mL/min/1.73 m^2 >60.0  3/21/19 10:08  >60.0  3/21/19 10:08   eGFR if African American >60 mL/min/1.73 m^2  >60 mL/min/1.73 m^2 >60.0  3/21/19 10:08  >60.0  3/21/19 10:08   Glucose 70 - 110 mg/dL 87  4/22/24 11:56   Calcium 8.7 - 10.5 mg/dL 10.2  4/22/24 11:56   Phosphorus Level 2.7 - 4.5 mg/dL 2.6 (L)  1/21/15 20:05   Magnesium  1.6 - 2.6 mg/dL 1.9  3/21/19 10:08   ALP 55 - 135 U/L 124  4/22/24 11:56   PROTEIN TOTAL 6.0 - 8.4 g/dL 9.4 (H)  4/22/24 11:56   Albumin 3.5 - 5.2 g/dL 3.9  4/22/24 11:56   BILIRUBIN TOTAL 0.1 - 1.0 mg/dL 0.5  4/22/24 11:56   AST 10 - 40 U/L 14  4/22/24 11:56   ALT 10 - 44 U/L 9 (L)  4/22/24 11:56   Cholesterol Total 80 - 200 mg/dL 184  3/21/19 10:08   HDL 40 - 75 mg/dL 36 (L)  3/21/19 10:08   HDL/Cholesterol Ratio 20.0 - 50.0 % 19.6 (L)  3/21/19 10:08   Non-HDL Cholesterol mg/dL 148  3/21/19 10:08   Total Cholesterol/HDL Ratio 2.0 - 5.0  5.1 (H)  3/21/19 10:08   Triglycerides 30 - 150 mg/dL 99  3/21/19 10:08   LDL Cholesterol <100 mg/dL 128 (H)  3/21/19 10:08   BNP 0 - 99 pg/mL 29  4/22/24 11:55   Troponin I 0.000 - 0.026 ng/mL <0.006  4/22/24 14:49   TSH 0.45 - 5.33 uIU/mL 2.29  2/6/19 09:17   Free T4 0.61 - 1.12 ng/dL 1.09  2/6/19 09:17   Prostate Specific Antigen 0 - 4.0 ng/ml 4.8 (H)  2/22/10 10:00   Gentamicin, Random ug/ml 0.4  12/14/09 04:35   Gentamicin, Trough 0.5 - 2.0 ug/ml 0.3 (L)  12/13/09 05:20   Phenytoin Level Total 10.0 - 20.0 ug/ml 4.8 (L)  12/10/09 16:28   RAPID STREP A SCREEN Negative  Negative  2/22/24 14:39   SARS-CoV-2 RNA, Amplification, Qual Negative  Negative  2/22/24 14:39   Specimen UA   Urine, Catheterized  1/21/15 20:54   Color, UA Yellow, Straw, Glendy  Yellow  1/21/15 20:54   Appearance, UA Clear  Cloudy !  1/21/15 20:54   Specific Gravity, UA 1.005 - 1.030  1.015  1/21/15 20:54   pH, UA 5.0 - 8.0  6.0  1/21/15 20:54   Protein, UA Negative  3+ !  1/21/15 20:54   Glucose, UA Negative  Negative  1/21/15 20:54   Ketones, UA Negative  1+ !  1/21/15 20:54   Blood,  UA Negative  2+ !  1/21/15 20:54   NITRITE UA Negative  Negative  1/21/15 20:54   UROBILINOGEN UA <2.0 EU/dL Negative  1/21/15 20:54   Bilirubin (UA) Negative  Negative  1/21/15 20:54   Leukocyte Esterase, UA Negative  3+ !  1/21/15 20:54   RBC, UA 0 - 4 /hpf 5 (H)  1/21/15 20:54   WBC, UA 0 - 5 /hpf >100 (H)  1/21/15 20:54   Bacteria, UA None-Occ /hpf Many !  1/21/15 20:54   Squam Epithel, UA /hpf 2  1/19/15 15:14   WBC Clumps, UA None-Rare  Rare  1/19/15 15:14   Hyaline Casts, UA 0-1/lpf /lpf 0  1/21/15 20:54   Ca Oxalate Renetta, UA Few-Mod  OCC  2/2/09 15:18         Microbiology Data: Reviewed      Summary of Chest Imaging Personally Reviewed:      CTA Chest-   Lungs/Pleura: There are moderate emphysematous changes. There is an opacity in the left lower lobe measuring 2.8 x 1.7 x 1.8 cm (series 5, image 172). There is bandlike atelectasis in the bilateral lower lobes. There is a small left pleural effusion.           CT CHEST 5/2024-   1. Stable known nodular consolidation with cavitary changes in the left lower lobe.  Differentials may include infectious process and malignancy.  Close imaging follow-up or tissue sampling is recommended.  2. Improved right lower lobe atelectasis.  3. Resolution of the left-sided pleural effusion.     2D Echo: None      PFT's: None           Assessment:     No diagnosis found.     Outpatient Encounter Medications as of 7/31/2024   Medication Sig Dispense Refill    aspirin (ECOTRIN) 81 MG EC tablet Take 81 mg by mouth once daily.      atorvastatin (LIPITOR) 10 MG tablet Take 1 tablet (10 mg total) by mouth once daily. 90 tablet 3    cetirizine (ZYRTEC) 10 MG tablet Take 1 tablet (10 mg total) by mouth once daily. 30 tablet 0    folic acid (FOLVITE) 1 MG tablet Take 1 tablet (1 mg total) by mouth once daily. 90 tablet 3     Facility-Administered Encounter Medications as of 7/31/2024   Medication Dose Route Frequency Provider Last Rate Last Admin    0.9%  NaCl infusion   Intravenous  Continuous Robert Nuñez MD        cyanocobalamin injection 1,000 mcg  1,000 mcg Intramuscular Q7 Days René Bauer MD   1,000 mcg at 07/29/19 0856     No orders of the defined types were placed in this encounter.      Plan:     Pulmonary nodule- 2.8 cm in LLL noted on CTA in ED after presentation for left sided chest pain in 4/2024. Now asymptomatic. Completed course of doxy., Imaging findings stable on 4 week follow up, but no resolution. Was planned for navigational bronchoscopy, but did not show for procedure. Daughter reports some issues in family that made transportation/follow up difficult, but now ready to re-evaluate. Differential remains infectious vs. Malignant.      -- Plan for repeat CT chest now to re-evaluate.. If persistent or enlarged will proceed with biopsy.   -- Case request has been placed for 8/30 (MARIA L + EBUS). Cyto + cultures   -- No OAC/Anti-PLT     I discussed with daughter that I will only call if the lesion has resolved and no biopsy needed.. If persistent they should plan to come for procedure as scheduled. If needs to be rescheduled please give 1 week notice so I can utilize slot for another patient.  Stressed importance of need to follow up.      2. History of seizure- none in years. No AEDs      Discussed procedure in detail with patient and wife. Risks including bleeding, PTX, respiratory failure, non-diagnostic sample, need for additional procedures and numerous additional potential complications up to death outlined. He verbalized understanding and all questions answered to their satisfaction. Written consent signed and on chart.      Torres Pagan MD   [facility]

## 2024-07-31 NOTE — H&P (VIEW-ONLY)
Pulmonary & Critical Care Medicine   Consultation Note     Reason for Consultation: Abnormal CT Chest      HPI: 74-year-old male with a history of prostate cancer, seizures.. Presented to ED with left sided chest pain- Had CT imaging chest with notable mass. To me today for evaluation. 2.8 left pulmonary nodule identified. No old imaging for comparison.   No seizures in years. Off meds.   States had acute left sided pain associated with congestion and sputum production prompting ED visit.   On abx (has taken 3 day)- all symptoms now resolved.   No LOPEZ/orthopnea/PND/chest pain  Denies breathing issues. Feels fine.   Gained some weight   No additional complaints.      Additional Pulmonary History:   Occupational/Environmental Exposures:From [place]. Lives in [place]. No home changes. No construction/renovations.   . Now retired..   Exposure to Animals/Pets: None   Travel History: None   History of exposures to TB: None   Family History of Lung Cancer: None   Tobacco= Quit 10 years. 50 years x 2 pk/day   Childhood history of Lung Disease:None   OAC/Anti-PLT- None         INTERVAl HISTORY- Had repeat CT chest in May.. Was scheduled for robotic bronchoscopy given persistence, but did not show.     -- Daughter present with him in clinic today. Reports lots of issues at home and transportation other issues difficult. They are ready to move forward. He has no new imaging since may available for me to review.   -- From a respiratory standpoint he reports no symptoms. Denies any SOB, LOPEZ, orthopnea, PND. No cough, sputum production or hemoptysis.   -- No OAC/Anti-PLT.   -- History from original visit is otherwise unchanged.   -- Gaining weight.   -- No additional systemic features.            Past Medical History:   Diagnosis Date    Cancer       prostate    Seizures              Past Surgical History:   Procedure Laterality Date    BRAIN SURGERY        COLONOSCOPY N/A 1/31/2024     Procedure:  "COLONOSCOPY;  Surgeon: Robert Nuñez MD;  Location: [facility];  Service: Endoscopy;  Laterality: N/A;    COLONOSCOPY W/ POLYPECTOMY   01/31/2024     5 poylps; large with 3 clips    FRACTURE SURGERY         left femur hardware    PROSTATE SURGERY          Social History:   Social History            Socioeconomic History    Marital status:    Tobacco Use    Smoking status: Former       Types: Cigarettes       Start date: 2018   Substance and Sexual Activity    Alcohol use: Yes       Comment: every now and then    Drug use: No             Family History   Problem Relation Name Age of Onset    No Known Problems Mother        No Known Problems Father          Drug Allergies:   Review of patient's allergies indicates:  No Known Allergies  Current Infusions:          Current Facility-Administered Medications   Medication Dose Route Frequency Last Rate Last Admin         Review of Systems:   A comprehensive 12-point review of systems was performed, and is negative except for those items mentioned above in the HPI section of this note.      Vital Signs:    /62 (BP Location: Right arm, Patient Position: Sitting)   Pulse (!) 57   Ht 5' 6" (1.676 m)   Wt 55.5 kg (122 lb 5.7 oz)   SpO2 97%   BMI 19.75 kg/m²       Physical Exam:   GEN- NAD AAOx3 Not toxic. Thin   HEENT- ATNC, PERRLA, EOMI, OP-Cl. No JVD, LAD or bruit noted. Trachea Midline.   CV- RRR No M/R/G  RESP- CTA-Bilateral   GI- S/NT/ND. Positive BS X 4. No HSM Noted   Ext- MAEW, No deformity. No edema or rashes noted.          Personal Review and Summary of Prior Diagnostics     Laboratory Studies: Reviewed        Latest Reference Range & Units Most Recent   WBC 3.90 - 12.70 K/uL 8.77  4/22/24 11:55   RBC 4.60 - 6.20 M/uL 4.52 (L)  4/22/24 11:55   Hemoglobin 14.0 - 18.0 g/dL 13.4 (L)  4/22/24 11:55   Hematocrit 40.0 - 54.0 % 42.3  4/22/24 11:55   MCV 82 - 98 fL 94  4/22/24 11:55   MCH 27.0 - 31.0 pg 29.6  4/22/24 11:55   MCHC 32.0 - 36.0 g/dL 31.7 " (L)  4/22/24 11:55   RDW 11.5 - 14.5 % 14.1  4/22/24 11:55   Platelet Count 150 - 450 K/uL 205  4/22/24 11:55   MPV 9.2 - 12.9 fL 10.9  4/22/24 11:55   Platelet Estimate Normal  Normal  12/14/09 04:35   Gran % 38.0 - 73.0 % 71.2  4/22/24 11:55   Lymph % 18.0 - 48.0 % 21.7  4/22/24 11:55   Lymphs 25 - 40 % 7 (L)  12/14/09 04:35   Mono % 4.0 - 15.0 % 5.7  4/22/24 11:55   Monocytes 0 - 10 % 4  12/14/09 04:35   Eos % 0.0 - 8.0 % 0.6  4/22/24 11:55   Eosinophils 0 - 8 % 1  12/14/09 04:35   Basophil % 0.0 - 1.9 % 0.5  4/22/24 11:55   Immature Granulocytes 0.0 - 0.5 % 0.3  4/22/24 11:55   Gran # (ANC) 1.8 - 7.7 K/uL 6.3  4/22/24 11:55   Lymph # 1.0 - 4.8 K/uL 1.9  4/22/24 11:55   Mono # 0.3 - 1.0 K/uL 0.5  4/22/24 11:55   Eos # 0.0 - 0.5 K/uL 0.1  4/22/24 11:55   Baso # 0.00 - 0.20 K/uL 0.04  4/22/24 11:55   Immature Grans (Abs) 0.00 - 0.04 K/uL 0.03  4/22/24 11:55   SEGS 50 - 70 % 88 (H)  12/14/09 04:35   Bands 0 - 6 % 5  12/12/09 05:20   nRBC 0 /100 WBC 0  4/22/24 11:55   Differential Method   Automated  4/22/24 11:55   Aniso   sl  12/14/09 04:35   Poikilocytosis   sl  12/14/09 04:35   Poly   sl  12/14/09 04:35   Iron 45 - 160 ug/dL 68  4/22/19 10:23   TIBC 250 - 450 ug/dL 296  4/22/19 10:23   Saturated Iron 20 - 50 % 23  4/22/19 10:23   Transferrin 200 - 375 mg/dL 200  4/22/19 10:23   Folate >=6.59 ng/mL 3.23 (L)  4/22/19 10:23   Vitamin B12 180 - 914 pg/mL 168 (L)  4/22/19 10:23   PT 9.0 - 12.5 sec 11.1  1/21/15 20:05   INR 0.8 - 1.2  1.1  1/21/15 20:05   D-Dimer <0.50 mg/L FEU 3.55 (H)  4/22/24 16:44   Sodium 136 - 145 mmol/L 139  4/22/24 11:56   Potassium 3.5 - 5.1 mmol/L 4.4  4/22/24 11:56   Chloride 95 - 110 mmol/L 103  4/22/24 11:56   CO2 23 - 29 mmol/L 21 (L)  4/22/24 11:56   Anion Gap 8 - 16 mmol/L 15  4/22/24 11:56   BUN 8 - 23 mg/dL 11  4/22/24 11:56   Creatinine 0.5 - 1.4 mg/dL 0.9  4/22/24 11:56   eGFR >60 mL/min/1.73 m^2 >60.0  4/22/24 11:56   eGFR if non African American >60 mL/min/1.73 m^2  >60  mL/min/1.73 m^2 >60.0  3/21/19 10:08  >60.0  3/21/19 10:08   eGFR if African American >60 mL/min/1.73 m^2  >60 mL/min/1.73 m^2 >60.0  3/21/19 10:08  >60.0  3/21/19 10:08   Glucose 70 - 110 mg/dL 87  4/22/24 11:56   Calcium 8.7 - 10.5 mg/dL 10.2  4/22/24 11:56   Phosphorus Level 2.7 - 4.5 mg/dL 2.6 (L)  1/21/15 20:05   Magnesium  1.6 - 2.6 mg/dL 1.9  3/21/19 10:08   ALP 55 - 135 U/L 124  4/22/24 11:56   PROTEIN TOTAL 6.0 - 8.4 g/dL 9.4 (H)  4/22/24 11:56   Albumin 3.5 - 5.2 g/dL 3.9  4/22/24 11:56   BILIRUBIN TOTAL 0.1 - 1.0 mg/dL 0.5  4/22/24 11:56   AST 10 - 40 U/L 14  4/22/24 11:56   ALT 10 - 44 U/L 9 (L)  4/22/24 11:56   Cholesterol Total 80 - 200 mg/dL 184  3/21/19 10:08   HDL 40 - 75 mg/dL 36 (L)  3/21/19 10:08   HDL/Cholesterol Ratio 20.0 - 50.0 % 19.6 (L)  3/21/19 10:08   Non-HDL Cholesterol mg/dL 148  3/21/19 10:08   Total Cholesterol/HDL Ratio 2.0 - 5.0  5.1 (H)  3/21/19 10:08   Triglycerides 30 - 150 mg/dL 99  3/21/19 10:08   LDL Cholesterol <100 mg/dL 128 (H)  3/21/19 10:08   BNP 0 - 99 pg/mL 29  4/22/24 11:55   Troponin I 0.000 - 0.026 ng/mL <0.006  4/22/24 14:49   TSH 0.45 - 5.33 uIU/mL 2.29  2/6/19 09:17   Free T4 0.61 - 1.12 ng/dL 1.09  2/6/19 09:17   Prostate Specific Antigen 0 - 4.0 ng/ml 4.8 (H)  2/22/10 10:00   Gentamicin, Random ug/ml 0.4  12/14/09 04:35   Gentamicin, Trough 0.5 - 2.0 ug/ml 0.3 (L)  12/13/09 05:20   Phenytoin Level Total 10.0 - 20.0 ug/ml 4.8 (L)  12/10/09 16:28   RAPID STREP A SCREEN Negative  Negative  2/22/24 14:39   SARS-CoV-2 RNA, Amplification, Qual Negative  Negative  2/22/24 14:39   Specimen UA   Urine, Catheterized  1/21/15 20:54   Color, UA Yellow, Straw, Glendy  Yellow  1/21/15 20:54   Appearance, UA Clear  Cloudy !  1/21/15 20:54   Specific Gravity, UA 1.005 - 1.030  1.015  1/21/15 20:54   pH, UA 5.0 - 8.0  6.0  1/21/15 20:54   Protein, UA Negative  3+ !  1/21/15 20:54   Glucose, UA Negative  Negative  1/21/15 20:54   Ketones, UA Negative  1+ !  1/21/15 20:54   Blood,  UA Negative  2+ !  1/21/15 20:54   NITRITE UA Negative  Negative  1/21/15 20:54   UROBILINOGEN UA <2.0 EU/dL Negative  1/21/15 20:54   Bilirubin (UA) Negative  Negative  1/21/15 20:54   Leukocyte Esterase, UA Negative  3+ !  1/21/15 20:54   RBC, UA 0 - 4 /hpf 5 (H)  1/21/15 20:54   WBC, UA 0 - 5 /hpf >100 (H)  1/21/15 20:54   Bacteria, UA None-Occ /hpf Many !  1/21/15 20:54   Squam Epithel, UA /hpf 2  1/19/15 15:14   WBC Clumps, UA None-Rare  Rare  1/19/15 15:14   Hyaline Casts, UA 0-1/lpf /lpf 0  1/21/15 20:54   Ca Oxalate Renetta, UA Few-Mod  OCC  2/2/09 15:18         Microbiology Data: Reviewed      Summary of Chest Imaging Personally Reviewed:      CTA Chest-   Lungs/Pleura: There are moderate emphysematous changes. There is an opacity in the left lower lobe measuring 2.8 x 1.7 x 1.8 cm (series 5, image 172). There is bandlike atelectasis in the bilateral lower lobes. There is a small left pleural effusion.           CT CHEST 5/2024-   1. Stable known nodular consolidation with cavitary changes in the left lower lobe.  Differentials may include infectious process and malignancy.  Close imaging follow-up or tissue sampling is recommended.  2. Improved right lower lobe atelectasis.  3. Resolution of the left-sided pleural effusion.     2D Echo: None      PFT's: None           Assessment:     No diagnosis found.     Outpatient Encounter Medications as of 7/31/2024   Medication Sig Dispense Refill    aspirin (ECOTRIN) 81 MG EC tablet Take 81 mg by mouth once daily.      atorvastatin (LIPITOR) 10 MG tablet Take 1 tablet (10 mg total) by mouth once daily. 90 tablet 3    cetirizine (ZYRTEC) 10 MG tablet Take 1 tablet (10 mg total) by mouth once daily. 30 tablet 0    folic acid (FOLVITE) 1 MG tablet Take 1 tablet (1 mg total) by mouth once daily. 90 tablet 3     Facility-Administered Encounter Medications as of 7/31/2024   Medication Dose Route Frequency Provider Last Rate Last Admin    0.9%  NaCl infusion   Intravenous  Continuous Robert Nuñez MD        cyanocobalamin injection 1,000 mcg  1,000 mcg Intramuscular Q7 Days René Bauer MD   1,000 mcg at 07/29/19 0856     No orders of the defined types were placed in this encounter.      Plan:     Pulmonary nodule- 2.8 cm in LLL noted on CTA in ED after presentation for left sided chest pain in 4/2024. Now asymptomatic. Completed course of doxy., Imaging findings stable on 4 week follow up, but no resolution. Was planned for navigational bronchoscopy, but did not show for procedure. Daughter reports some issues in family that made transportation/follow up difficult, but now ready to re-evaluate. Differential remains infectious vs. Malignant.      -- Plan for repeat CT chest now to re-evaluate.. If persistent or enlarged will proceed with biopsy.   -- Case request has been placed for 8/30 (MARIA L + EBUS). Cyto + cultures   -- No OAC/Anti-PLT     I discussed with daughter that I will only call if the lesion has resolved and no biopsy needed.. If persistent they should plan to come for procedure as scheduled. If needs to be rescheduled please give 1 week notice so I can utilize slot for another patient.  Stressed importance of need to follow up.      2. History of seizure- none in years. No AEDs      Discussed procedure in detail with patient and wife. Risks including bleeding, PTX, respiratory failure, non-diagnostic sample, need for additional procedures and numerous additional potential complications up to death outlined. He verbalized understanding and all questions answered to their satisfaction. Written consent signed and on chart.      Torres Pagan MD   [facility]

## 2024-08-28 ENCOUNTER — TELEPHONE (OUTPATIENT)
Dept: PULMONOLOGY | Facility: CLINIC | Age: 74
End: 2024-08-28
Payer: MEDICARE

## 2024-08-28 NOTE — TELEPHONE ENCOUNTER
I spoke with patient daughter, Damaso to let her know arrival time to [facility] for 7am on 8/30/24 for robotic bronchoscopy with Dr Pagan. NPO after midnight. Patient did stop ASA. I answered all questions. Stephonzay confirmed and verbalized understanding.

## 2024-08-29 ENCOUNTER — ANESTHESIA EVENT (OUTPATIENT)
Dept: SURGERY | Facility: HOSPITAL | Age: 74
End: 2024-08-29
Payer: MEDICARE

## 2024-08-29 NOTE — ANESTHESIA PREPROCEDURE EVALUATION
[facility]  Anesthesia Pre-Operative Evaluation         Patient Name: Jorge Bourne  YOB: 1950  MRN: [id number]    SUBJECTIVE:     Pre-operative evaluation for Procedure(s) (LRB):  ROBOTIC BRONCHOSCOPY (N/A)     08/29/2024    Jorge Bourne is a 74 y.o. male w/ a significant PMHx of anemia (last Hgb 13.4 on 04/22/24), HLD, prostate cancer, seizures, prior PE, and L pulmonary nodule.    Patient now presents for the above procedure(s).      LDA:      Prev airway: None documented.    Drips: None documented.    Patient Active Problem List   Diagnosis    Edentulous    History of prostate cancer    Suprapubic catheter    FTT (failure to thrive) in adult    Hyperlipidemia    Cataract of left eye    Hypokalemia    Anemia    Borderline hypertension    Hearing loss of right ear    History of pulmonary embolus (PE)       Review of patient's allergies indicates:  No Known Allergies    Current Outpatient Medications:    Current Facility-Administered Medications:     cyanocobalamin injection 1,000 mcg, 1,000 mcg, Intramuscular, Q7 Days, René Bauer MD, 1,000 mcg at 07/29/19 0856    Current Outpatient Medications:     aspirin (ECOTRIN) 81 MG EC tablet, Take 81 mg by mouth once daily., Disp: , Rfl:     atorvastatin (LIPITOR) 10 MG tablet, Take 1 tablet (10 mg total) by mouth once daily., Disp: 90 tablet, Rfl: 3    cetirizine (ZYRTEC) 10 MG tablet, Take 1 tablet (10 mg total) by mouth once daily., Disp: 30 tablet, Rfl: 0    folic acid (FOLVITE) 1 MG tablet, Take 1 tablet (1 mg total) by mouth once daily. (Patient not taking: Reported on 7/31/2024), Disp: 90 tablet, Rfl: 3    Facility-Administered Medications Ordered in Other Encounters:     0.9%  NaCl infusion, , Intravenous, Continuous, Robert Nuñez MD    Past Surgical History:   Procedure Laterality Date    BRAIN SURGERY      COLONOSCOPY N/A 1/31/2024    Procedure: COLONOSCOPY;  Surgeon: Robert Nuñez MD;  Location: [facility];   Pt has come in for a BP Check.    Pt was taking a low dose of Amlodipine which was recently switched to amlodipine 10mg PO QD.    Pt reports no side affects from recent medication change. Pt states she is feeling better as well.      Blood pressure was checked with pt's home bp machine and manually.   BP home machine : /79   HR:  81    Bp Manually: 137/86        JHL is not in the office today, AL has reveiewed and advised no change, continue current dose, and report back with any concerns.    Service: Endoscopy;  Laterality: N/A;    COLONOSCOPY W/ POLYPECTOMY  01/31/2024    5 poylps; large with 3 clips    FRACTURE SURGERY      left femur hardware    PROSTATE SURGERY      ROBOTIC BRONCHOSCOPY N/A 5/31/2024    Procedure: ROBOTIC BRONCHOSCOPY;  Surgeon: Torres Pagan MD;  Location: [facility] OR [address] [place] [address];  Service: Pulmonary;  Laterality: N/A;       Social History     Socioeconomic History    Marital status:    Tobacco Use    Smoking status: Former     Types: Cigarettes     Start date: 2018   Substance and Sexual Activity    Alcohol use: Yes     Comment: every now and then    Drug use: No       OBJECTIVE:     Vital Signs Range (Last 24H):         Significant Labs:  Lab Results   Component Value Date    WBC 8.77 04/22/2024    HGB 13.4 (L) 04/22/2024    HCT 42.3 04/22/2024     04/22/2024    CHOL 184 03/21/2019    TRIG 99 03/21/2019    HDL 36 (L) 03/21/2019    ALT 9 (L) 04/22/2024    AST 14 04/22/2024     04/22/2024    K 4.4 04/22/2024     04/22/2024    CREATININE 0.9 04/22/2024    BUN 11 04/22/2024    CO2 21 (L) 04/22/2024    TSH 2.29 02/06/2019    PSA 4.8 (H) 02/22/2010    INR 1.1 01/21/2015       Diagnostic Studies: No relevant studies.    EKG:   Results for orders placed or performed during the hospital encounter of 04/22/24   EKG 12-lead    Collection Time: 04/22/24 11:23 AM   Result Value Ref Range    QRS Duration 86 ms    [facility] QTC Calculation 439 ms    Narrative    Test Reason : R07.9,    Vent. Rate : 064 BPM     Atrial Rate : 064 BPM     P-R Int : 154 ms          QRS Dur : 086 ms      QT Int : 426 ms       P-R-T Axes : 076 048 040 degrees     QTc Int : 439 ms    Normal sinus rhythm  Possible Anterior infarct ,age undetermined  Abnormal ECG    Confirmed by Renae Acevedo MD ([id number]) on 4/22/2024 1:12:05 PM    Referred By:             Confirmed By:Renae Acevedo MD       2D ECHO:  TTE:  No results found for this or any previous visit.    GUERRERO:  No results found for this or any  previous visit.    ASSESSMENT/PLAN:           Pre-op Assessment    I have reviewed the Patient Summary Reports.     I have reviewed the Nursing Notes. I have reviewed the NPO Status.   I have reviewed the Medications.     Review of Systems  Anesthesia Hx:   History of prior surgery of interest to airway management or planning:  Previous anesthesia: General, MAC        Denies Family Hx of Anesthesia complications.    Denies Personal Hx of Anesthesia complications.                    Social:  Former Smoker, Alcohol Use       Hematology/Oncology:       -- Anemia:                    --  Cancer in past history:                     Cardiovascular:                hyperlipidemia                             Pulmonary:         Pulmonary nodule               Neurological:       Seizures                                    Physical Exam  General: Cooperative, Alert and Oriented    Airway:  Mallampati: II   Mouth Opening: Normal  TM Distance: Normal  Tongue: Normal  Neck ROM: Normal ROM    Dental:  Edentulous    Chest/Lungs:  Normal Respiratory Rate, Clear to auscultation    Heart:  Rate: Normal  Rhythm: Regular Rhythm        Anesthesia Plan  Type of Anesthesia, risks & benefits discussed:    Anesthesia Type: Gen ETT  Intra-op Monitoring Plan: Standard ASA Monitors  Post Op Pain Control Plan: multimodal analgesia and IV/PO Opioids PRN  Induction:  IV  Airway Plan: Direct and Video, Post-Induction  Informed Consent: Informed consent signed with the Patient and all parties understand the risks and agree with anesthesia plan.  All questions answered.   ASA Score: 3  Day of Surgery Review of History & Physical: H&P Update referred to the surgeon/provider.    Ready For Surgery From Anesthesia Perspective.     .

## 2024-08-30 ENCOUNTER — HOSPITAL ENCOUNTER (OUTPATIENT)
Facility: HOSPITAL | Age: 74
Discharge: HOME | End: 2024-08-30
Attending: EMERGENCY MEDICINE | Admitting: EMERGENCY MEDICINE
Payer: MEDICARE

## 2024-08-30 ENCOUNTER — ANESTHESIA (OUTPATIENT)
Dept: SURGERY | Facility: HOSPITAL | Age: 74
End: 2024-08-30
Payer: MEDICARE

## 2024-08-30 VITALS
TEMPERATURE: 98 F | SYSTOLIC BLOOD PRESSURE: 111 MMHG | DIASTOLIC BLOOD PRESSURE: 61 MMHG | HEIGHT: 66 IN | RESPIRATION RATE: 16 BRPM | HEART RATE: 56 BPM | WEIGHT: 122.38 LBS | BODY MASS INDEX: 19.67 KG/M2 | OXYGEN SATURATION: 94 %

## 2024-08-30 DIAGNOSIS — Z85.46 HISTORY OF PROSTATE CANCER: ICD-10-CM

## 2024-08-30 DIAGNOSIS — R91.1 LUNG NODULE: ICD-10-CM

## 2024-08-30 DIAGNOSIS — R91.1 PULMONARY NODULE 1 CM OR GREATER IN DIAMETER: Primary | ICD-10-CM

## 2024-08-30 DIAGNOSIS — R03.0 BORDERLINE HYPERTENSION: ICD-10-CM

## 2024-08-30 DIAGNOSIS — R62.7 FTT (FAILURE TO THRIVE) IN ADULT: ICD-10-CM

## 2024-08-30 DIAGNOSIS — E87.6 HYPOKALEMIA: ICD-10-CM

## 2024-08-30 DIAGNOSIS — Z93.59 SUPRAPUBIC CATHETER: ICD-10-CM

## 2024-08-30 DIAGNOSIS — Z86.711 HISTORY OF PULMONARY EMBOLUS (PE): ICD-10-CM

## 2024-08-30 DIAGNOSIS — K08.109 EDENTULOUS: ICD-10-CM

## 2024-08-30 LAB
GRAM STN SPEC: NORMAL
GRAM STN SPEC: NORMAL

## 2024-08-30 PROCEDURE — 87015 SPECIMEN INFECT AGNT CONCNTJ: CPT | Performed by: EMERGENCY MEDICINE

## 2024-08-30 PROCEDURE — 36000709 HC OR TIME LEV III EA ADD 15 MIN: Performed by: EMERGENCY MEDICINE

## 2024-08-30 PROCEDURE — 25000003 PHARM REV CODE 250: Performed by: STUDENT IN AN ORGANIZED HEALTH CARE EDUCATION/TRAINING PROGRAM

## 2024-08-30 PROCEDURE — 37000008 HC ANESTHESIA 1ST 15 MINUTES: Performed by: EMERGENCY MEDICINE

## 2024-08-30 PROCEDURE — 71000044 HC DOSC ROUTINE RECOVERY FIRST HOUR: Performed by: EMERGENCY MEDICINE

## 2024-08-30 PROCEDURE — 87075 CULTR BACTERIA EXCEPT BLOOD: CPT | Performed by: EMERGENCY MEDICINE

## 2024-08-30 PROCEDURE — 31627 NAVIGATIONAL BRONCHOSCOPY: CPT | Mod: ,,, | Performed by: EMERGENCY MEDICINE

## 2024-08-30 PROCEDURE — 71000016 HC POSTOP RECOV ADDL HR: Performed by: EMERGENCY MEDICINE

## 2024-08-30 PROCEDURE — 71000015 HC POSTOP RECOV 1ST HR: Performed by: EMERGENCY MEDICINE

## 2024-08-30 PROCEDURE — 87205 SMEAR GRAM STAIN: CPT | Performed by: EMERGENCY MEDICINE

## 2024-08-30 PROCEDURE — 63600175 PHARM REV CODE 636 W HCPCS: Performed by: STUDENT IN AN ORGANIZED HEALTH CARE EDUCATION/TRAINING PROGRAM

## 2024-08-30 PROCEDURE — 37000009 HC ANESTHESIA EA ADD 15 MINS: Performed by: EMERGENCY MEDICINE

## 2024-08-30 PROCEDURE — 31624 DX BRONCHOSCOPE/LAVAGE: CPT | Mod: 51,,, | Performed by: EMERGENCY MEDICINE

## 2024-08-30 PROCEDURE — 31654 BRONCH EBUS IVNTJ PERPH LES: CPT | Mod: ,,, | Performed by: EMERGENCY MEDICINE

## 2024-08-30 PROCEDURE — 36000708 HC OR TIME LEV III 1ST 15 MIN: Performed by: EMERGENCY MEDICINE

## 2024-08-30 PROCEDURE — 25000003 PHARM REV CODE 250: Performed by: EMERGENCY MEDICINE

## 2024-08-30 PROCEDURE — C1726 CATH, BAL DIL, NON-VASCULAR: HCPCS | Performed by: EMERGENCY MEDICINE

## 2024-08-30 PROCEDURE — 31628 BRONCHOSCOPY/LUNG BX EACH: CPT | Mod: 51,,, | Performed by: EMERGENCY MEDICINE

## 2024-08-30 PROCEDURE — 87206 SMEAR FLUORESCENT/ACID STAI: CPT | Performed by: EMERGENCY MEDICINE

## 2024-08-30 PROCEDURE — 87102 FUNGUS ISOLATION CULTURE: CPT | Performed by: EMERGENCY MEDICINE

## 2024-08-30 PROCEDURE — 87116 MYCOBACTERIA CULTURE: CPT | Performed by: EMERGENCY MEDICINE

## 2024-08-30 PROCEDURE — 87070 CULTURE OTHR SPECIMN AEROBIC: CPT | Performed by: EMERGENCY MEDICINE

## 2024-08-30 PROCEDURE — 31652 BRONCH EBUS SAMPLNG 1/2 NODE: CPT | Mod: ,,, | Performed by: EMERGENCY MEDICINE

## 2024-08-30 PROCEDURE — 27201423 OPTIME MED/SURG SUP & DEVICES STERILE SUPPLY: Performed by: EMERGENCY MEDICINE

## 2024-08-30 RX ORDER — ONDANSETRON HYDROCHLORIDE 2 MG/ML
INJECTION, SOLUTION INTRAVENOUS
Status: DISCONTINUED | OUTPATIENT
Start: 2024-08-30 | End: 2024-08-30

## 2024-08-30 RX ORDER — HALOPERIDOL 5 MG/ML
0.5 INJECTION INTRAMUSCULAR EVERY 10 MIN PRN
Status: DISCONTINUED | OUTPATIENT
Start: 2024-08-30 | End: 2024-08-30 | Stop reason: HOSPADM

## 2024-08-30 RX ORDER — LIDOCAINE HYDROCHLORIDE 10 MG/ML
INJECTION, SOLUTION EPIDURAL; INFILTRATION; INTRACAUDAL; PERINEURAL
Status: DISCONTINUED | OUTPATIENT
Start: 2024-08-30 | End: 2024-08-30 | Stop reason: HOSPADM

## 2024-08-30 RX ORDER — ROCURONIUM BROMIDE 10 MG/ML
INJECTION, SOLUTION INTRAVENOUS
Status: DISCONTINUED | OUTPATIENT
Start: 2024-08-30 | End: 2024-08-30

## 2024-08-30 RX ORDER — GLUCAGON 1 MG
1 KIT INJECTION
Status: DISCONTINUED | OUTPATIENT
Start: 2024-08-30 | End: 2024-08-30 | Stop reason: HOSPADM

## 2024-08-30 RX ORDER — PROPOFOL 10 MG/ML
VIAL (ML) INTRAVENOUS
Status: DISCONTINUED | OUTPATIENT
Start: 2024-08-30 | End: 2024-08-30

## 2024-08-30 RX ORDER — LIDOCAINE HYDROCHLORIDE 20 MG/ML
INJECTION, SOLUTION EPIDURAL; INFILTRATION; INTRACAUDAL; PERINEURAL
Status: DISCONTINUED | OUTPATIENT
Start: 2024-08-30 | End: 2024-08-30

## 2024-08-30 RX ORDER — EPHEDRINE SULFATE 50 MG/ML
INJECTION, SOLUTION INTRAVENOUS
Status: DISCONTINUED | OUTPATIENT
Start: 2024-08-30 | End: 2024-08-30

## 2024-08-30 RX ORDER — FENTANYL CITRATE 50 UG/ML
INJECTION, SOLUTION INTRAMUSCULAR; INTRAVENOUS
Status: DISCONTINUED | OUTPATIENT
Start: 2024-08-30 | End: 2024-08-30

## 2024-08-30 RX ORDER — FENTANYL CITRATE 50 UG/ML
25 INJECTION, SOLUTION INTRAMUSCULAR; INTRAVENOUS EVERY 5 MIN PRN
Status: DISCONTINUED | OUTPATIENT
Start: 2024-08-30 | End: 2024-08-30 | Stop reason: HOSPADM

## 2024-08-30 RX ORDER — PHENYLEPHRINE HCL IN 0.9% NACL 1 MG/10 ML
SYRINGE (ML) INTRAVENOUS
Status: DISCONTINUED | OUTPATIENT
Start: 2024-08-30 | End: 2024-08-30

## 2024-08-30 RX ORDER — SODIUM CHLORIDE 0.9 % (FLUSH) 0.9 %
10 SYRINGE (ML) INJECTION
Status: DISCONTINUED | OUTPATIENT
Start: 2024-08-30 | End: 2024-08-30 | Stop reason: HOSPADM

## 2024-08-30 RX ORDER — DEXAMETHASONE SODIUM PHOSPHATE 4 MG/ML
INJECTION, SOLUTION INTRA-ARTICULAR; INTRALESIONAL; INTRAMUSCULAR; INTRAVENOUS; SOFT TISSUE
Status: DISCONTINUED | OUTPATIENT
Start: 2024-08-30 | End: 2024-08-30

## 2024-08-30 RX ORDER — ACETAMINOPHEN 500 MG
1000 TABLET ORAL ONCE
Status: COMPLETED | OUTPATIENT
Start: 2024-08-30 | End: 2024-08-30

## 2024-08-30 RX ORDER — KETAMINE HCL IN 0.9 % NACL 50 MG/5 ML
SYRINGE (ML) INTRAVENOUS
Status: DISCONTINUED | OUTPATIENT
Start: 2024-08-30 | End: 2024-08-30

## 2024-08-30 RX ADMIN — ACETAMINOPHEN 1000 MG: 500 TABLET ORAL at 07:08

## 2024-08-30 RX ADMIN — Medication 200 MCG: at 09:08

## 2024-08-30 RX ADMIN — ONDANSETRON 4 MG: 2 INJECTION INTRAMUSCULAR; INTRAVENOUS at 10:08

## 2024-08-30 RX ADMIN — LIDOCAINE HYDROCHLORIDE 100 MG: 20 INJECTION, SOLUTION EPIDURAL; INFILTRATION; INTRACAUDAL at 09:08

## 2024-08-30 RX ADMIN — Medication 100 MCG: at 09:08

## 2024-08-30 RX ADMIN — EPHEDRINE SULFATE 5 MG: 50 INJECTION INTRAVENOUS at 09:08

## 2024-08-30 RX ADMIN — ROCURONIUM BROMIDE 50 MG: 10 INJECTION INTRAVENOUS at 09:08

## 2024-08-30 RX ADMIN — SUGAMMADEX 400 MG: 100 INJECTION, SOLUTION INTRAVENOUS at 10:08

## 2024-08-30 RX ADMIN — Medication 150 MCG: at 09:08

## 2024-08-30 RX ADMIN — Medication 15 MG: at 09:08

## 2024-08-30 RX ADMIN — SODIUM CHLORIDE: 0.9 INJECTION, SOLUTION INTRAVENOUS at 09:08

## 2024-08-30 RX ADMIN — GLYCOPYRROLATE 0.2 MG: 0.2 INJECTION INTRAMUSCULAR; INTRAVENOUS at 09:08

## 2024-08-30 RX ADMIN — PROPOFOL 150 MG: 10 INJECTION, EMULSION INTRAVENOUS at 09:08

## 2024-08-30 RX ADMIN — DEXAMETHASONE SODIUM PHOSPHATE 8 MG: 4 INJECTION INTRA-ARTICULAR; INTRALESIONAL; INTRAMUSCULAR; INTRAVENOUS; SOFT TISSUE at 09:08

## 2024-08-30 RX ADMIN — FENTANYL CITRATE 100 MCG: 50 INJECTION, SOLUTION INTRAMUSCULAR; INTRAVENOUS at 09:08

## 2024-08-30 NOTE — TRANSFER OF CARE
"Anesthesia Transfer of Care Note    Patient: Jorge Bourne    Procedure(s) Performed: Procedure(s) (LRB):  ROBOTIC BRONCHOSCOPY (N/A)  ENDOBRONCHIAL ULTRASOUND (EBUS) (N/A)    Patient location: PACU    Anesthesia Type: general    Transport from OR: Transported from OR on 6-10 L/min O2 by face mask with adequate spontaneous ventilation    Post pain: adequate analgesia    Post assessment: no apparent anesthetic complications    Post vital signs: stable    Level of consciousness: awake    Nausea/Vomiting: no nausea/vomiting    Complications: none    Transfer of care protocol was followed      Last vitals: Visit Vitals  BP (!) 181/78 (BP Location: Right arm, Patient Position: Lying)   Pulse 61   Temp 36.6 °C (97.8 °F) (Tympanic)   Resp 18   Ht 5' 6" (1.676 m)   Wt 55.5 kg (122 lb 5.7 oz)   SpO2 99%   BMI 19.75 kg/m²     "

## 2024-08-30 NOTE — ANESTHESIA POSTPROCEDURE EVALUATION
Anesthesia Post Evaluation    Patient: Jorge Bourne    Procedure(s) Performed: Procedure(s) (LRB):  ROBOTIC BRONCHOSCOPY (N/A)  ENDOBRONCHIAL ULTRASOUND (EBUS) (N/A)    Final Anesthesia Type: general      Patient location during evaluation: [facility]  Patient participation: Yes- Able to Participate  Level of consciousness: awake and alert and oriented  Post-procedure vital signs: reviewed and stable  Pain management: adequate  Airway patency: patent    PONV status at discharge: No PONV  Anesthetic complications: no      Cardiovascular status: blood pressure returned to baseline and hemodynamically stable  Respiratory status: unassisted, spontaneous ventilation and room air  Hydration status: euvolemic  Follow-up not needed.              Vitals Value Taken Time   /61 08/30/24 1216   Temp 36.9 °C (98.4 °F) 08/30/24 1215   Pulse 61 08/30/24 1219   Resp 13 08/30/24 1219   SpO2 95 % 08/30/24 1219   Vitals shown include unfiled device data.      No case tracking events are documented in the log.      Pain/Tiffanie Score: Pain Rating Prior to Med Admin: 0 (8/30/2024  7:52 AM)  Tiffanie Score: 9 (8/30/2024 10:30 AM)

## 2024-08-30 NOTE — DISCHARGE SUMMARY
[facility] - Surgery (2nd Fl)  Discharge Note  Short Stay    Procedure(s) (LRB):  ROBOTIC BRONCHOSCOPY (N/A)  ENDOBRONCHIAL ULTRASOUND (EBUS) (N/A)      OUTCOME: Patient tolerated treatment/procedure well without complication and is now ready for discharge.    DISPOSITION: Home or Self Care    FINAL DIAGNOSIS: LLL lesion    FOLLOWUP:  will call to discuss results    DISCHARGE INSTRUCTIONS:  No discharge procedures on file.     TIME SPENT ON DISCHARGE: 15 minutes

## 2024-08-30 NOTE — INTERVAL H&P NOTE
The patient has been examined and the H&P has been reviewed:    I concur with the findings and no changes have occurred since H&P was written.    Procedure risks, benefits and alternative options discussed and understood by patient/family.      Torres Pagan MD   [facility]   
See MDM

## 2024-08-30 NOTE — ANESTHESIA PROCEDURE NOTES
Intubation    Date/Time: 8/30/2024 9:23 AM    Performed by: Yanelis Wetzel MD  Authorized by: Alethea Parada MD    Intubation:     Induction:  Intravenous    Intubated:  Postinduction    Mask Ventilation:  Easy with oral airway    Attempts:  1    Attempted By:  Resident anesthesiologist    Method of Intubation:  Direct    Blade:  Lewis 2    Laryngeal View Grade: Grade I - full view of cords      Difficult Airway Encountered?: No      Complications:  None    Airway Device:  Oral endotracheal tube    Airway Device Size:  8.0    Style/Cuff Inflation:  Cuffed (inflated to minimal occlusive pressure)    Tube secured:  23    Secured at:  The lips    Placement Verified By:  Capnometry    Complicating Factors:  None    Findings Post-Intubation:  Atraumatic/condition of teeth unchanged and BS equal bilateral

## 2024-08-30 NOTE — PLAN OF CARE
Patient is stable and ready for discharge. Instructions given to patient and family. Questions answered. Patient tolerating po liquids with no difficulty. Patient has no pain or states it is a tolerable level for them. Anesthesia consent and surgical consent in chart.   Pt p/w deficits in strength and balance impacting ADLs and mobility

## 2024-09-02 LAB — BACTERIA SPEC AEROBE CULT: NO GROWTH

## 2024-09-03 LAB
ACID FAST MOD KINY STN SPEC: NORMAL
MYCOBACTERIUM SPEC QL CULT: NORMAL

## 2024-09-06 LAB — BACTERIA SPEC ANAEROBE CULT: NORMAL

## 2024-09-09 LAB
ADEQUACY: ABNORMAL
FINAL PATHOLOGIC DIAGNOSIS: ABNORMAL
Lab: ABNORMAL

## 2024-09-10 DIAGNOSIS — C34.32 ADENOCARCINOMA OF LOWER LOBE OF LEFT LUNG: Primary | ICD-10-CM

## 2024-09-12 DIAGNOSIS — C34.90 MALIGNANT NEOPLASM OF UNSPECIFIED PART OF UNSPECIFIED BRONCHUS OR LUNG: Primary | ICD-10-CM

## 2024-09-12 NOTE — PROGRESS NOTES
Biopsy results reviewed- + adenocarcinoma.   Needs MRI brain/NM PET to complete staging. No beckie involvement     Orders placed.   Discussed with wife/daughter. Patient reportedly not interested in surgery. Will send to rad-Onc.     Torres Pagan MD   [facility]

## 2024-09-13 ENCOUNTER — TELEPHONE (OUTPATIENT)
Dept: HEMATOLOGY/ONCOLOGY | Facility: CLINIC | Age: 74
End: 2024-09-13
Payer: MEDICARE

## 2024-09-13 NOTE — TELEPHONE ENCOUNTER
Attempted to contact Mr. Bourne to schedule Imaging and Rad/Onc appointment.  Left a detailed message. Will try again.

## 2024-09-13 NOTE — TELEPHONE ENCOUNTER
Attempted to schedule Mr. Bourne for Cancer related appointments. Wife states he's not here right now. Will try again.

## 2024-09-16 LAB
FUNGUS SPEC CULT: NORMAL
FUNGUS SPEC CULT: NORMAL

## 2024-09-17 ENCOUNTER — TELEPHONE (OUTPATIENT)
Dept: HEMATOLOGY/ONCOLOGY | Facility: CLINIC | Age: 74
End: 2024-09-17
Payer: MEDICARE

## 2024-09-17 NOTE — NURSING
Spoke with Patient's wife about upcoming appointments.  Offered sooner appointment dates.  Declined.  She is only off on Fridays and requests appointments on Fridays only.  Patient scheduled for MRI  PET Ct and Rad/ Onc appointment.  Instructions given.  Appointment slip mailed.  Oncology Navigation   Intake  Date of Diagnosis: 09/19/24  Cancer Type: Thoracic  Type of Referral: Internal  Date of Referral: 09/12/24  Initial Nurse Navigator Contact: 09/13/24  Referral to Initial Contact Timeline (days): 1  First Appointment Available: 09/26/24  Appointment Date: 10/04/24  First Available Date vs. Scheduled Date (days): 8  Multiple appointments: Yes  Reason if booked > 7 days after scheduling: Patient request; Transportation coordination     Treatment  Current Status: Staging work-up    Surgery: N/A       Radiation Oncologist: Philippe    Procedures: PET scan; MRI  MRI Schedule Date: 09/27/24  PET Scan Schedule Date: 10/04/24          Radiation Oncologist: Philippe    Support Systems: Spouse/significant other  Barriers of Care: Transportation  Transportation Barriers: Wife is only available to drive patient on Fridays     Acuity  Treatment Tolerability: Has not started treatment yet/treatment fully completed and side effects resolved  Comorbidities in Medical History: 2  Hospitalization Within the Past Month: 0   Needed: 0  Verbalizes Financial Concerns: 1  Transportation: 1  History of noncompliance/frequent no shows and cancellations: 0  Verbalizes the need for more education: 1  Navigation Acuity: 6     Follow Up  No follow-ups on file.

## 2024-09-27 ENCOUNTER — EXTERNAL HOME HEALTH (OUTPATIENT)
Dept: HOME HEALTH SERVICES | Facility: HOSPITAL | Age: 74
End: 2024-09-27
Payer: MEDICARE

## 2024-10-04 ENCOUNTER — OFFICE VISIT (OUTPATIENT)
Dept: RADIATION ONCOLOGY | Facility: CLINIC | Age: 74
End: 2024-10-04
Payer: MEDICARE

## 2024-10-04 ENCOUNTER — HOSPITAL ENCOUNTER (OUTPATIENT)
Dept: RADIOLOGY | Facility: HOSPITAL | Age: 74
Discharge: HOME | End: 2024-10-04
Attending: EMERGENCY MEDICINE
Payer: MEDICARE

## 2024-10-04 VITALS
OXYGEN SATURATION: 99 % | HEIGHT: 66 IN | BODY MASS INDEX: 20.62 KG/M2 | WEIGHT: 128.31 LBS | TEMPERATURE: 97 F | SYSTOLIC BLOOD PRESSURE: 126 MMHG | DIASTOLIC BLOOD PRESSURE: 73 MMHG | HEART RATE: 58 BPM

## 2024-10-04 DIAGNOSIS — C34.32 PRIMARY ADENOCARCINOMA OF LOWER LOBE OF LEFT LUNG: Primary | ICD-10-CM

## 2024-10-04 DIAGNOSIS — Z85.46 HISTORY OF PROSTATE CANCER: ICD-10-CM

## 2024-10-04 DIAGNOSIS — C34.90 MALIGNANT NEOPLASM OF UNSPECIFIED PART OF UNSPECIFIED BRONCHUS OR LUNG: ICD-10-CM

## 2024-10-04 LAB — POCT GLUCOSE: 101 MG/DL (ref 70–110)

## 2024-10-04 PROCEDURE — 78815 PET IMAGE W/CT SKULL-THIGH: CPT | Mod: TC

## 2024-10-04 PROCEDURE — A9552 F18 FDG: HCPCS | Performed by: EMERGENCY MEDICINE

## 2024-10-04 PROCEDURE — 99999 PR PBB SHADOW E&M-EST. PATIENT-LVL III: CPT | Mod: PBBFAC,,, | Performed by: STUDENT IN AN ORGANIZED HEALTH CARE EDUCATION/TRAINING PROGRAM

## 2024-10-04 PROCEDURE — 78815 PET IMAGE W/CT SKULL-THIGH: CPT | Mod: 26,PI,, | Performed by: NUCLEAR MEDICINE

## 2024-10-04 RX ORDER — FLUDEOXYGLUCOSE F18 500 MCI/ML
12 INJECTION INTRAVENOUS
Status: COMPLETED | OUTPATIENT
Start: 2024-10-04 | End: 2024-10-04

## 2024-10-04 RX ADMIN — FLUDEOXYGLUCOSE F-18 12.13 MILLICURIE: 500 INJECTION INTRAVENOUS at 08:10

## 2024-10-04 NOTE — PROGRESS NOTES
"[facility] Consult Note    Referring provider: Torres Pagan MD    Assessment:  Jorge Bourne is a 74 y.o. male with a group stage IB, [id number] adenocarcinoma of the LLL.  Hx of prostate cancer s/p radiotherapy per patient and wife. Does not appear to be seeds on personal review. Last PSA "a while ago." Will request records as they think RT was at [facility] in 2008. I can see PSA from 2010, and appears elevated to 4.8 (not a normal level post intervention)  No distant mets or LN on today's PET  ECOG: (1) Restricted in physically strenuous activity, ambulatory and able to do work of light nature        Plan:  Treatment options were discussed with the patient including surgery, radiotherapy, systemic therapy and some combination thereof.  We discussed the goals of treatment to be curative, but I have asked pathology to comment on lung markers, given hx of prostate cancer.   I have also ordered repeat PSA  The risks, benefits, scheduling, alternatives to and rationale of radiation therapy were explained in detail.    I suggested that he meet with thoracic surgery, but he declined surgery. He would like to proceed with definitive radiotherapy.  Consent was obtained and all questions were answered to the best of my ability  A CT simulation will be performed on 10/11/24 to begin the planning process for the patient's radiation therapy.     He was given our contact information, and he was told that he could call our clinic at any time if he has any questions or concerns.    Radiation Treatment Details:   50-60 Gy in 4-5 fractions SBRT to the LL      Oncologic History:  He has a history of seizure and prostate cancer s/p radiation?? completed 2008 they believe at [facility].  Has suprapubic catheter for recurrent infections and incontinence?  4/22/24: CTA Chest for cough demonstrated  nodular appearing LLL opacity, suspicious for neoplasm. 1cm subcarinal LN  5/24/24: CT chest WO: stable LLL nodule with " "cavitary changes. No mediastinal or hilar adenopathy.   8/5/24: CT chest WO: stable nodular consolidation with cavitary changes in the LLL, abutting the major fissure, measuring 3cm and 4cm cc on personal reivew. No hilar or mediastinal adenopathy but limited by non contrast imaging.   8/30/24: Bronch and EBUS  Op note: no endobronchial lesions. 2.3 cm LLL nodule biopsied. 11L sampled. No path appearing LN at 11R, 4R, 7, 4L  Path: DENIZ well differentiated adenocarcinoma mucinous type. "Lymph node" (11L per op note) negative for malignancy with lymphocytes present.    KRAS G12D  PDL1 pending  9/27/24: MRI brain GINA, with moderate to large right temporal encephalomalacia subjacent to craniotomy which may be sequela of prior lesion resection       Possibility of pregnancy: N/A  History of prior irradiation: Yes - prostate only per wife.   History of prior systemic anti-cancer therapy: No  History of collagen vascular disease: No  Implanted electronic device (pacer/defib/nerve stimulator): No     History of Present Illness:  Jorge Bourne presents today to discuss the role of radiotherapy.     He denies weight loss, hemoptysis, worsening cough, CP, worsening SOB. Independent in ADLs but his wife provides the majority of today's information. He lives on a two story house and will sometimes be tired after climbing one flight. No supplemental oxygen use. He is not interested in surgery.     Review of Systems:  ROS as above    Social History:  Social History     Tobacco Use    Smoking status: Former     Types: Cigarettes     Start date: 2018   Substance Use Topics    Alcohol use: Yes     Comment: every now and then    Drug use: No       Past Medical History:  Past Medical History:   Diagnosis Date    Cancer     prostate    Seizures        Past Surgical History:   Procedure Laterality Date    BRAIN SURGERY      COLONOSCOPY N/A 1/31/2024    Procedure: COLONOSCOPY;  Surgeon: Robert Nuñez MD;  Location: [facility];  " Service: Endoscopy;  Laterality: N/A;    COLONOSCOPY W/ POLYPECTOMY  01/31/2024    5 poylps; large with 3 clips    ENDOBRONCHIAL ULTRASOUND N/A 8/30/2024    Procedure: ENDOBRONCHIAL ULTRASOUND (EBUS);  Surgeon: Torres Pagan MD;  Location: [facility] OR 2ND FLR;  Service: Pulmonary;  Laterality: N/A;    FRACTURE SURGERY      left femur hardware    PROSTATE SURGERY      ROBOTIC BRONCHOSCOPY N/A 5/31/2024    Procedure: ROBOTIC BRONCHOSCOPY;  Surgeon: Torres Pagan MD;  Location: [facility] OR 2ND FLR;  Service: Pulmonary;  Laterality: N/A;    ROBOTIC BRONCHOSCOPY N/A 8/30/2024    Procedure: ROBOTIC BRONCHOSCOPY;  Surgeon: Torres Pagan MD;  Location: [facility] OR 2ND FLR;  Service: Pulmonary;  Laterality: N/A;         Medications:  Current Outpatient Medications on File Prior to Visit   Medication Sig Dispense Refill    aspirin (ECOTRIN) 81 MG EC tablet Take 81 mg by mouth once daily. (Patient not taking: Reported on 10/4/2024)      atorvastatin (LIPITOR) 10 MG tablet Take 1 tablet (10 mg total) by mouth once daily. 90 tablet 3    cetirizine (ZYRTEC) 10 MG tablet Take 1 tablet (10 mg total) by mouth once daily. 30 tablet 0    folic acid (FOLVITE) 1 MG tablet Take 1 tablet (1 mg total) by mouth once daily. (Patient not taking: Reported on 7/31/2024) 90 tablet 3     Current Facility-Administered Medications on File Prior to Visit   Medication Dose Route Frequency Provider Last Rate Last Admin    0.9%  NaCl infusion   Intravenous Continuous Robert Nuñez MD        cyanocobalamin injection 1,000 mcg  1,000 mcg Intramuscular Q7 Days René Bauer MD   1,000 mcg at 07/29/19 0856    [COMPLETED] fludeoxyglucose F-18 injection 12 millicurie  12 millicurie Intravenous ONCE PRN Torres Pagan MD   12.13 millicurie at 10/04/24 0818       Allergies:  Review of patient's allergies indicates:  No Known Allergies    Exam:  Vitals:    10/04/24 0911   BP: 126/73   BP Location: Left arm   Patient Position: Sitting  "  Pulse: (!) 58   Temp: 97.2 °F (36.2 °C)   SpO2: 99%   Weight: 58.2 kg (128 lb 4.9 oz)   Height: 5' 6" (1.676 m)     Constitutional: Pleasant 74 y.o. male in no acute distress.  Well nourished. Well groomed.   HEENT: Normocephalic and atraumatic   Cardiovascular: Upper extremities warm to touch  Lungs: No audible wheezing.  Normal effort.   Musculoskeletal: No gross MSK deformities. Ambulates without assistance  Skin: No rashes appreciated.  Psych: Alert and oriented with appropriate mood and affect.  Neuro:  Grossly normal.    Data Review:  Information obtained from Jorge Bourne and via chart review.     Independent Interpretation of Test(s): CT chest from 8/5/24 was personally reviewed as above.          Monroe Paez MD  Radiation Oncology      "

## 2024-10-07 ENCOUNTER — DOCUMENT SCAN (OUTPATIENT)
Dept: HOME HEALTH SERVICES | Facility: HOSPITAL | Age: 74
End: 2024-10-07
Payer: MEDICARE

## 2024-10-17 ENCOUNTER — DOCUMENT SCAN (OUTPATIENT)
Dept: HOME HEALTH SERVICES | Facility: HOSPITAL | Age: 74
End: 2024-10-17
Payer: MEDICARE

## 2024-10-30 ENCOUNTER — EXTERNAL HOME HEALTH (OUTPATIENT)
Dept: HOME HEALTH SERVICES | Facility: HOSPITAL | Age: 74
End: 2024-10-30
Payer: MEDICARE

## 2024-11-07 ENCOUNTER — TELEPHONE (OUTPATIENT)
Dept: RADIATION ONCOLOGY | Facility: CLINIC | Age: 74
End: 2024-11-07
Payer: MEDICARE

## 2024-11-07 NOTE — TELEPHONE ENCOUNTER
Notified pt that his simulation appointment was rescheduled for November 12, 2024 at 8am.  Pt accepted appointment time and date.  Appointment reminder in the mail.

## 2024-11-12 ENCOUNTER — HOSPITAL ENCOUNTER (OUTPATIENT)
Dept: RADIATION THERAPY | Facility: HOSPITAL | Age: 74
Discharge: HOME | End: 2024-11-12
Payer: MEDICARE

## 2024-11-12 DIAGNOSIS — C34.32 PRIMARY ADENOCARCINOMA OF LOWER LOBE OF LEFT LUNG: ICD-10-CM

## 2024-11-12 DIAGNOSIS — C34.90 MALIGNANT NEOPLASM OF UNSPECIFIED PART OF UNSPECIFIED BRONCHUS OR LUNG: Primary | ICD-10-CM

## 2024-11-12 PROCEDURE — 77334 RADIATION TREATMENT AID(S): CPT | Mod: 26,,, | Performed by: STUDENT IN AN ORGANIZED HEALTH CARE EDUCATION/TRAINING PROGRAM

## 2024-11-12 PROCEDURE — 77263 THER RADIOLOGY TX PLNG CPLX: CPT | Mod: ,,, | Performed by: STUDENT IN AN ORGANIZED HEALTH CARE EDUCATION/TRAINING PROGRAM

## 2024-11-12 PROCEDURE — 77014 PR  CT GUIDANCE PLACEMENT RAD THERAPY FIELDS: CPT | Mod: 26,,, | Performed by: STUDENT IN AN ORGANIZED HEALTH CARE EDUCATION/TRAINING PROGRAM

## 2024-11-12 PROCEDURE — 77014 HC CT GUIDANCE RADIATION THERAPY FLDS PLACEMENT: CPT | Mod: TC | Performed by: STUDENT IN AN ORGANIZED HEALTH CARE EDUCATION/TRAINING PROGRAM

## 2024-11-12 PROCEDURE — 77334 RADIATION TREATMENT AID(S): CPT | Mod: TC | Performed by: STUDENT IN AN ORGANIZED HEALTH CARE EDUCATION/TRAINING PROGRAM

## 2024-11-15 DIAGNOSIS — C61 BIOCHEMICALLY RECURRENT MALIGNANT NEOPLASM OF PROSTATE: ICD-10-CM

## 2024-11-15 DIAGNOSIS — R97.21 BIOCHEMICALLY RECURRENT MALIGNANT NEOPLASM OF PROSTATE: ICD-10-CM

## 2024-11-15 DIAGNOSIS — C34.32 PRIMARY ADENOCARCINOMA OF LOWER LOBE OF LEFT LUNG: ICD-10-CM

## 2024-11-15 DIAGNOSIS — Z85.46 HISTORY OF PROSTATE CANCER: Primary | ICD-10-CM

## 2024-11-15 NOTE — PROGRESS NOTES
Ms. Bourne had a death in the family and cancelled multiple simulation appts. He reached out and was scheduled for CT sim, bloodwork and CT chest. PSA risen to 35. I had reached out to path and well diff mucinous appearance was suggestive of lung primary. Reached out again in light of elevated PSA and will obtain PSMA.    Anticipate referral to  med onc for biochemical recurrence of prostate cancer.     Spoke with he and his wife. They are amendable to PSMA PET          Monroe Paez MD  Radiation Oncology

## 2024-11-25 ENCOUNTER — HOSPITAL ENCOUNTER (OUTPATIENT)
Dept: RADIOLOGY | Facility: HOSPITAL | Age: 74
Discharge: HOME | End: 2024-11-25
Attending: STUDENT IN AN ORGANIZED HEALTH CARE EDUCATION/TRAINING PROGRAM
Payer: MEDICARE

## 2024-11-25 ENCOUNTER — TELEPHONE (OUTPATIENT)
Dept: RADIATION ONCOLOGY | Facility: CLINIC | Age: 74
End: 2024-11-25
Payer: MEDICARE

## 2024-11-25 ENCOUNTER — DOCUMENT SCAN (OUTPATIENT)
Dept: HOME HEALTH SERVICES | Facility: HOSPITAL | Age: 74
End: 2024-11-25
Payer: MEDICARE

## 2024-11-25 DIAGNOSIS — Z85.46 HISTORY OF PROSTATE CANCER: ICD-10-CM

## 2024-11-25 DIAGNOSIS — C34.32 PRIMARY ADENOCARCINOMA OF LOWER LOBE OF LEFT LUNG: ICD-10-CM

## 2024-11-25 DIAGNOSIS — R97.21 BIOCHEMICALLY RECURRENT MALIGNANT NEOPLASM OF PROSTATE: Primary | ICD-10-CM

## 2024-11-25 DIAGNOSIS — R97.21 BIOCHEMICALLY RECURRENT MALIGNANT NEOPLASM OF PROSTATE: ICD-10-CM

## 2024-11-25 DIAGNOSIS — C61 BIOCHEMICALLY RECURRENT MALIGNANT NEOPLASM OF PROSTATE: ICD-10-CM

## 2024-11-25 DIAGNOSIS — C61 BIOCHEMICALLY RECURRENT MALIGNANT NEOPLASM OF PROSTATE: Primary | ICD-10-CM

## 2024-11-25 PROCEDURE — A9596 HC GALLIUM GA-68 GOZETOTIDE, DX (ILLUCCIX), PER 1 MCI: HCPCS | Mod: TB | Performed by: STUDENT IN AN ORGANIZED HEALTH CARE EDUCATION/TRAINING PROGRAM

## 2024-11-25 PROCEDURE — 78815 PET IMAGE W/CT SKULL-THIGH: CPT | Mod: 26,PS,, | Performed by: NUCLEAR MEDICINE

## 2024-11-25 PROCEDURE — 78815 PET IMAGE W/CT SKULL-THIGH: CPT | Mod: TC

## 2024-11-25 RX ADMIN — KIT FOR THE PREPARATION OF GALLIUM GA 68 GOZETOTIDE INJECTION 5.32 MILLICURIE: KIT INTRAVENOUS at 02:11

## 2024-11-25 NOTE — TELEPHONE ENCOUNTER
I spoke with Mr. Bourne and his spouse. Reviewed results of PSMA PET, with concern for biopchemical recurrence in the prostate, possibly involving rectal wall.     Referred to urology and med onc    Continuing with plan for SBRT to the LLL adenocarcinoma. They are in agreement.     Monroe Paez MD  Radiation Oncology

## 2024-11-27 PROCEDURE — G0179 MD RECERTIFICATION HHA PT: HCPCS | Mod: ,,, | Performed by: FAMILY MEDICINE

## 2024-11-29 ENCOUNTER — TELEPHONE (OUTPATIENT)
Dept: HEMATOLOGY/ONCOLOGY | Facility: CLINIC | Age: 74
End: 2024-11-29
Payer: MEDICARE

## 2024-12-02 ENCOUNTER — HOSPITAL ENCOUNTER (OUTPATIENT)
Dept: RADIATION THERAPY | Facility: HOSPITAL | Age: 74
Discharge: HOME | End: 2024-12-02
Attending: STUDENT IN AN ORGANIZED HEALTH CARE EDUCATION/TRAINING PROGRAM
Payer: MEDICARE

## 2024-12-06 ENCOUNTER — PATIENT MESSAGE (OUTPATIENT)
Dept: RADIATION ONCOLOGY | Facility: CLINIC | Age: 74
End: 2024-12-06
Payer: MEDICARE

## 2024-12-09 ENCOUNTER — OFFICE VISIT (OUTPATIENT)
Dept: UROLOGY | Facility: CLINIC | Age: 74
End: 2024-12-09
Payer: MEDICARE

## 2024-12-09 VITALS
SYSTOLIC BLOOD PRESSURE: 99 MMHG | BODY MASS INDEX: 19.6 KG/M2 | DIASTOLIC BLOOD PRESSURE: 68 MMHG | HEIGHT: 66 IN | WEIGHT: 121.94 LBS | HEART RATE: 59 BPM

## 2024-12-09 DIAGNOSIS — R97.21 BIOCHEMICALLY RECURRENT MALIGNANT NEOPLASM OF PROSTATE: ICD-10-CM

## 2024-12-09 DIAGNOSIS — Z85.46 HISTORY OF PROSTATE CANCER: ICD-10-CM

## 2024-12-09 DIAGNOSIS — C61 BIOCHEMICALLY RECURRENT MALIGNANT NEOPLASM OF PROSTATE: ICD-10-CM

## 2024-12-09 PROCEDURE — 1101F PT FALLS ASSESS-DOCD LE1/YR: CPT | Mod: CPTII,S$GLB,, | Performed by: STUDENT IN AN ORGANIZED HEALTH CARE EDUCATION/TRAINING PROGRAM

## 2024-12-09 PROCEDURE — 3078F DIAST BP <80 MM HG: CPT | Mod: CPTII,S$GLB,, | Performed by: STUDENT IN AN ORGANIZED HEALTH CARE EDUCATION/TRAINING PROGRAM

## 2024-12-09 PROCEDURE — 99999 PR PBB SHADOW E&M-EST. PATIENT-LVL III: CPT | Mod: PBBFAC,,, | Performed by: STUDENT IN AN ORGANIZED HEALTH CARE EDUCATION/TRAINING PROGRAM

## 2024-12-09 PROCEDURE — 1126F AMNT PAIN NOTED NONE PRSNT: CPT | Mod: CPTII,S$GLB,, | Performed by: STUDENT IN AN ORGANIZED HEALTH CARE EDUCATION/TRAINING PROGRAM

## 2024-12-09 PROCEDURE — 99205 OFFICE O/P NEW HI 60 MIN: CPT | Mod: S$GLB,,, | Performed by: STUDENT IN AN ORGANIZED HEALTH CARE EDUCATION/TRAINING PROGRAM

## 2024-12-09 PROCEDURE — 3074F SYST BP LT 130 MM HG: CPT | Mod: CPTII,S$GLB,, | Performed by: STUDENT IN AN ORGANIZED HEALTH CARE EDUCATION/TRAINING PROGRAM

## 2024-12-09 PROCEDURE — 1159F MED LIST DOCD IN RCRD: CPT | Mod: CPTII,S$GLB,, | Performed by: STUDENT IN AN ORGANIZED HEALTH CARE EDUCATION/TRAINING PROGRAM

## 2024-12-09 PROCEDURE — 3288F FALL RISK ASSESSMENT DOCD: CPT | Mod: CPTII,S$GLB,, | Performed by: STUDENT IN AN ORGANIZED HEALTH CARE EDUCATION/TRAINING PROGRAM

## 2024-12-09 NOTE — PROGRESS NOTES
"[facility] [address]   Clinic Note    SUBJECTIVE:     Chief Complaint: prostate cancer    History of Present Illness:  Jorge Bourne is a 74 y.o. male who presents to clinic for prostate cancer. He is new to our clinic referred by Dr. Monroe Paez Jr..     Has chronic urinary retention managed with SP tube.  Underwent EBRT for prostate cancer in 2007. PSA 11/2024 noted to be 36.8; none prior since 2010 (was 5.1 then; PSADT 5 years.) PSMA showed radiotracer uptake in prostate, potentially extending to seminal vesicles and anterior rectal wall. No sites of distant metastasis or lymph node spread.   In 08/2024 underwent biopsy of lung mass - diagnosed with primary lung cancer.  Has f/u with Heme-Onc scheduled 12/16/24.     Anticoagulation:  No    OBJECTIVE:     Estimated body mass index is 19.68 kg/m² as calculated from the following:    Height as of this encounter: 5' 6" (1.676 m).    Weight as of this encounter: 55.3 kg (121 lb 14.6 oz).    Vital Signs (Most Recent)  Pulse: (!) 59 (12/09/24 0924)  BP: 99/68 (12/09/24 0924)    Physical Exam  Vitals reviewed.   Constitutional:       Appearance: Normal appearance.   HENT:      Head: Normocephalic and atraumatic.   Eyes:      Conjunctiva/sclera: Conjunctivae normal.   Pulmonary:      Effort: Pulmonary effort is normal.   Abdominal:      General: Abdomen is flat. There is no distension.      Tenderness: There is no abdominal tenderness.   Genitourinary:     Comments: SP tube draining clear yellow urine  Skin:     General: Skin is warm and dry.   Neurological:      General: No focal deficit present.      Mental Status: He is alert and oriented to person, place, and time.   Psychiatric:         Mood and Affect: Mood normal.         Behavior: Behavior normal.         Thought Content: Thought content normal.         Judgment: Judgment normal.       Lab Results   Component Value Date    BUN 11 04/22/2024    CREATININE 0.8 11/14/2024    WBC 8.77 04/22/2024    HGB " 13.4 (L) 04/22/2024    HCT 42.3 04/22/2024     04/22/2024    AST 14 04/22/2024    ALT 9 (L) 04/22/2024    ALKPHOS 124 04/22/2024    ALBUMIN 3.9 04/22/2024        Lab Results   Component Value Date    PSA 4.8 (H) 02/22/2010    PSA 5.0 (H) 09/16/2009    PSA 4.8 12/17/2008    PSADIAG 36.8 (H) 11/14/2024       ASSESSMENT     1. Biochemically recurrent malignant neoplasm of prostate    2. History of prostate cancer      PLAN:   1. Biochemically recurrent malignant neoplasm of prostate  -     Ambulatory referral/consult to Urology  -     MRI Prostate W W/O Contrast; Future; Expected date: 12/09/2024    2. History of prostate cancer  -     Ambulatory referral/consult to Urology       Discussed patient's options for management of localized recurrence of prostate cancer after radiotherapy. Will obtain MRI prostate to better evaluate for possibility of rectal involvement.   Per NCCN guidelines, options include monitoring, ADT, or local therapy (repeat XRT or brachytherapy, cryotherapy or HIFU, or salvage prostatectomy.) Given presence of lung primary cancer, suspect oncologic benefit of secondary treatment would be outweighed  by risks/side effects.  F/u with Heme-Onc 12/16; I recommend that ADT be considered at this appointment, particularly if MRI prostate shows involvement of rectum.    Jaime Sandoval MD     Letter to Monroe Paez Jr., *

## 2024-12-11 PROCEDURE — 77014 PR  CT GUIDANCE PLACEMENT RAD THERAPY FIELDS: CPT | Mod: 26,,, | Performed by: STUDENT IN AN ORGANIZED HEALTH CARE EDUCATION/TRAINING PROGRAM

## 2024-12-11 PROCEDURE — 77373 STRTCTC BDY RAD THER TX DLVR: CPT | Performed by: STUDENT IN AN ORGANIZED HEALTH CARE EDUCATION/TRAINING PROGRAM

## 2024-12-16 ENCOUNTER — OFFICE VISIT (OUTPATIENT)
Dept: HEMATOLOGY/ONCOLOGY | Facility: CLINIC | Age: 74
End: 2024-12-16
Payer: MEDICARE

## 2024-12-16 VITALS
OXYGEN SATURATION: 99 % | RESPIRATION RATE: 18 BRPM | SYSTOLIC BLOOD PRESSURE: 108 MMHG | DIASTOLIC BLOOD PRESSURE: 62 MMHG | WEIGHT: 124.56 LBS | HEART RATE: 66 BPM | BODY MASS INDEX: 20.1 KG/M2 | TEMPERATURE: 98 F

## 2024-12-16 DIAGNOSIS — Z86.711 HISTORY OF PULMONARY EMBOLUS (PE): ICD-10-CM

## 2024-12-16 DIAGNOSIS — Z79.818 ANDROGEN DEPRIVATION THERAPY: ICD-10-CM

## 2024-12-16 DIAGNOSIS — Z09 RADIOTHERAPY FOLLOW-UP: ICD-10-CM

## 2024-12-16 DIAGNOSIS — Z79.899 LONG TERM CURRENT USE OF THERAPEUTIC DRUG: ICD-10-CM

## 2024-12-16 DIAGNOSIS — C61 PROSTATE CANCER: Primary | ICD-10-CM

## 2024-12-16 DIAGNOSIS — Z93.59 SUPRAPUBIC CATHETER: ICD-10-CM

## 2024-12-16 DIAGNOSIS — C34.32 PRIMARY ADENOCARCINOMA OF LOWER LOBE OF LEFT LUNG: ICD-10-CM

## 2024-12-16 DIAGNOSIS — C34.32 PRIMARY ADENOCARCINOMA OF LOWER LOBE OF LEFT LUNG: Primary | ICD-10-CM

## 2024-12-16 PROCEDURE — 99999 PR PBB SHADOW E&M-EST. PATIENT-LVL V: CPT | Mod: PBBFAC,,, | Performed by: HOSPITALIST

## 2024-12-16 PROCEDURE — 3288F FALL RISK ASSESSMENT DOCD: CPT | Mod: CPTII,S$GLB,, | Performed by: HOSPITALIST

## 2024-12-16 PROCEDURE — 1101F PT FALLS ASSESS-DOCD LE1/YR: CPT | Mod: CPTII,S$GLB,, | Performed by: HOSPITALIST

## 2024-12-16 PROCEDURE — 99205 OFFICE O/P NEW HI 60 MIN: CPT | Mod: S$GLB,,, | Performed by: HOSPITALIST

## 2024-12-16 PROCEDURE — G2211 COMPLEX E/M VISIT ADD ON: HCPCS | Mod: S$GLB,,, | Performed by: HOSPITALIST

## 2024-12-16 PROCEDURE — 3078F DIAST BP <80 MM HG: CPT | Mod: CPTII,S$GLB,, | Performed by: HOSPITALIST

## 2024-12-16 PROCEDURE — 1126F AMNT PAIN NOTED NONE PRSNT: CPT | Mod: CPTII,S$GLB,, | Performed by: HOSPITALIST

## 2024-12-16 PROCEDURE — 1159F MED LIST DOCD IN RCRD: CPT | Mod: CPTII,S$GLB,, | Performed by: HOSPITALIST

## 2024-12-16 PROCEDURE — 3074F SYST BP LT 130 MM HG: CPT | Mod: CPTII,S$GLB,, | Performed by: HOSPITALIST

## 2024-12-16 RX ORDER — VIT C/E/ZN/COPPR/LUTEIN/ZEAXAN 250MG-90MG
1000 CAPSULE ORAL DAILY
Qty: 30 CAPSULE | Refills: 11 | Status: SHIPPED | OUTPATIENT
Start: 2024-12-16

## 2024-12-16 NOTE — PATIENT INSTRUCTIONS
We discussed your recent diagnsis of a recurrent prostate cancer following radiation treatment ~17 years ago. PSA levels are high and PSMA PET imaging confirms a locally advanced tumor in the prostate. We discussed management strategy for non-metastatic recurrent prostate cancer. We need to determine if any salvage surgical, ablative or radiation procedures are possible. MRI will help determine this. Admittedly, I suspect the likely of salvage surgery or ablation is low. We can also address with the urology team about managemetn of the suprapubic tube and urinary obstruction.    We also discussed medical management of non-metastatic recurrent prostate cancer. This is usually done with hormone therapy. Specifically I recommend androgen deprviation therapy with Lupron shots. We will plan to give you your first Lupron shot shortly after your upcoming MRI. If no salvage treatment is possible, we may want to add additional hormone pills like darolutamide, but we can dicusss at a future date.    We discussed typical side effects of hormone therapy including fatigue, weight gain, loss libido, forgetfulness, hot flashes, and muscle loss. Other longer term risks can include increased risk of cardiovascular disease and osteoporosis.    - Please start taking vitamin D 1000 IU daily; I called a prescription into your local pharmacy  - Please get 4 servings of calcium daily; if you cannot get 4 servings of dairy daily we can add a calcium supplement  - We will schedule a bone mineral density test    - We will schedule you an appointment with our genetics counseling clinic to discuss screening for the presence of a high risk cancer gene      Follow up with me after your MRI on 1/13/25
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
## 2024-12-16 NOTE — Clinical Note
Can we try to get old urology and radiation oncology records from [facility]? Thanks!

## 2024-12-17 ENCOUNTER — DOCUMENT SCAN (OUTPATIENT)
Dept: HOME HEALTH SERVICES | Facility: HOSPITAL | Age: 74
End: 2024-12-17
Payer: MEDICARE

## 2024-12-17 NOTE — ASSESSMENT & PLAN NOTE
He has an MRI coming up in January. Will review with urology and rad onc to consider salvage options. Will need to obtain prior radiation records. Presuming no surgical salvage options availabe, would plan to start ADT shortly after MRI. This could be a finite course in combination with salvage therapy or potneitally an indefinite course with further ARSI.   - MRI prostate; scheduled 1/13/25  - Plan to start Lupron shortly afterward MR prostate  - Review at   - Consider intensification with darolutamide if no plan for salvage RT or ablation  - Referral to cancer genetics  - Scheduled DEXA and start vitamin D

## 2025-01-07 ENCOUNTER — TUMOR BOARD CONFERENCE (OUTPATIENT)
Dept: UROLOGY | Facility: HOSPITAL | Age: 75
End: 2025-01-07
Payer: MEDICARE

## 2025-01-08 NOTE — PROGRESS NOTES
I have reviewed the notes, assessments, and/or procedures performed by Dr. De La Torre, I concur with her/his documentation of Jorge Bourne.  Date of Service: 1/7/2025

## 2025-01-08 NOTE — PROGRESS NOTES
"  PATIENT SUMMARY:  Jorge Bourne is a 74 y.o. male with locally recurrent prostate cancer. Initially diagnosed ~2007, he reportedly underwent definitive RT therapy. Post treatment complicated by bladder outlet obstruction and urethro-cutaneous fistula requiring SPT placement 2016. He was lost to urologic followup, later found to have PSA 36.6 11/2024 in setting of workup for  stage IB lung cancer managed via SBRT. PSMA imaging 11/25/24 with locally recurrent disease but no beckie or distant metastases.         PERFORMANCE STATUS: ECOG 2    LIFE EXPECTANCY (if applicable): <10 years    STAGING SCANS COMPLETE: NM bone scan",mpMRI prostate,Pylarify PSMA PET scan,Ga PSMA PET SCAN",no scans to date.  PSMA PET     Clinical/Pathologic Stage (TNM): Tx Nx Mx    NCCN Risk Group: low risk",favorable intermediate risk,unfavorable intermediate risk,high risk,very high risk    FACULTY IN ATTENDANCE:    Urologic Oncology: Alex Nascimento MD [x], Roderick Fair MD [x] , Silvestre Sharpe MD [x], Manish Barboza MD [x], Juan Carlos Wen MD []     Medical Oncology: Durga Gomez MD [x], Celeste Page MD [x] , Valentin Mojica MD [x], Robb Abarca MD [] Corey Jackson MD [], Reina Pink MD [] , Sandy Mejias MD [], Tanja Roque MD [] Kristen Quintero MD []     Radiation Oncology: Nabeel Devine MD [x], Maverick Donnelly MD [x]     CONSULT NEEDED:     [] Urologic Oncology    [] Med Onc    [] Rad/Onc  [] CRS [] Surg Onc      [x]Treatment Guidelines (NCCN and AUA) reviewed and care planned is consistent with guidelines.    TUMOR BOARD RECOMMENDATIONS/PLAN/CONSENSUS:     Case discussed among group. Pathology and radiologic images were reviewed (if applicable).    Discussion: PSMA PET scan showing local recurrence of prostate cancer, but no distant disease. Is an MRI going to be helpful?      Plan: MRI to better assess. Will likely plan for ADT/enzalutamide. Radiation unlikely to be helpful in this situation per radiation oncology. No great " surgical options. Unlikely to be able to resect mass without removing bladder.

## 2025-01-16 ENCOUNTER — INFUSION (OUTPATIENT)
Dept: INFUSION THERAPY | Facility: HOSPITAL | Age: 75
End: 2025-01-16
Payer: MEDICARE

## 2025-01-16 ENCOUNTER — OFFICE VISIT (OUTPATIENT)
Dept: HEMATOLOGY/ONCOLOGY | Facility: CLINIC | Age: 75
End: 2025-01-16
Payer: MEDICARE

## 2025-01-16 VITALS
HEIGHT: 66 IN | HEART RATE: 70 BPM | WEIGHT: 124.44 LBS | SYSTOLIC BLOOD PRESSURE: 110 MMHG | OXYGEN SATURATION: 100 % | RESPIRATION RATE: 16 BRPM | BODY MASS INDEX: 20 KG/M2 | DIASTOLIC BLOOD PRESSURE: 58 MMHG

## 2025-01-16 DIAGNOSIS — M81.0 OSTEOPOROSIS, UNSPECIFIED OSTEOPOROSIS TYPE, UNSPECIFIED PATHOLOGICAL FRACTURE PRESENCE: ICD-10-CM

## 2025-01-16 DIAGNOSIS — N36.0 URETHROCUTANEOUS FISTULA: ICD-10-CM

## 2025-01-16 DIAGNOSIS — C61 PROSTATE CANCER: Primary | ICD-10-CM

## 2025-01-16 DIAGNOSIS — K08.109 EDENTULOUS: ICD-10-CM

## 2025-01-16 PROBLEM — R03.0 BORDERLINE HYPERTENSION: Status: RESOLVED | Noted: 2019-02-19 | Resolved: 2025-01-16

## 2025-01-16 PROCEDURE — 1101F PT FALLS ASSESS-DOCD LE1/YR: CPT | Mod: CPTII,S$GLB,, | Performed by: HOSPITALIST

## 2025-01-16 PROCEDURE — 3074F SYST BP LT 130 MM HG: CPT | Mod: CPTII,S$GLB,, | Performed by: HOSPITALIST

## 2025-01-16 PROCEDURE — 3078F DIAST BP <80 MM HG: CPT | Mod: CPTII,S$GLB,, | Performed by: HOSPITALIST

## 2025-01-16 PROCEDURE — 1126F AMNT PAIN NOTED NONE PRSNT: CPT | Mod: CPTII,S$GLB,, | Performed by: HOSPITALIST

## 2025-01-16 PROCEDURE — 99215 OFFICE O/P EST HI 40 MIN: CPT | Mod: S$GLB,,, | Performed by: HOSPITALIST

## 2025-01-16 PROCEDURE — 99999 PR PBB SHADOW E&M-EST. PATIENT-LVL III: CPT | Mod: PBBFAC,,, | Performed by: HOSPITALIST

## 2025-01-16 PROCEDURE — G2211 COMPLEX E/M VISIT ADD ON: HCPCS | Mod: S$GLB,,, | Performed by: HOSPITALIST

## 2025-01-16 PROCEDURE — 3288F FALL RISK ASSESSMENT DOCD: CPT | Mod: CPTII,S$GLB,, | Performed by: HOSPITALIST

## 2025-01-16 PROCEDURE — 63600175 PHARM REV CODE 636 W HCPCS: Mod: JZ,TB | Performed by: HOSPITALIST

## 2025-01-16 PROCEDURE — 96402 CHEMO HORMON ANTINEOPL SQ/IM: CPT

## 2025-01-16 RX ADMIN — LEUPROLIDE ACETATE 22.5 MG: KIT at 11:01

## 2025-01-16 NOTE — PROGRESS NOTES
Advanced Prostate Cancer Clinic: Established patient visit  Best Contact Phone Number(s): [phone] (home)       Cancer/Stage/TNM:    Cancer Staging   Primary adenocarcinoma of lower lobe of left lung  Staging form: Lung, AJCC 8th Edition  - Clinical stage from 10/4/2024: cT2a, cN0 - Unsigned        Reason for visit:  Locally recurrent prostate cancer    Molecular:  Germline Testing: Referred  Tempus xT: GNAS; DAHYC50A; TET2    Treatment History:   2007      EBRT      Densitometery:  DEXA 12/23/24 - Osteoporosis         HPI:   Jorge Bourne is a 75 y.o. male with locally recurrent prostate cancer. Initially diagnosed ~2007, he reportedly underwent definitive RT therapy. Post treatment complicated by bladder outlet obstruction and urethro-cutaneous fistula requiring SPT placement 2016. He was lost to urologic followup, later found to have PSA 36.6 11/2024 in setting of workup for  stage IB lung cancer managed via SBRT. PSMA imaging 11/25/24 with locally recurrent disease but no beckie or distant metastases. He presents to  medical oncology clinic for initial evaluation.     Interval Events:    No signficant new concerns today. Denies signficant pain. SPT functioning. Does have some prograde urine flow. Deneis leakage about fistula.     Plan:   Will start Lupron today for non-metastatic local recurrence. Will intensify tehrapy per EMBARK. However, given seizure history, will not use enzlutamide. Prefer darolutamide. RX sent to [facility]. Will also start Prolia for osteoporosis. He is edentulous so can defer dental clearance.    -- Lupron today  -- Intensify with darolutamide per EMBARK - vicky contraindidcated ddue to seizure history  -- Edentulous. Osteoporosis. FU 4 weeks with Prolia  -- Reschedule MRI  -- Have him follow up with urology    Follow up:   Route Chart for Scheduling    Med Onc Chart Routing      Follow up with physician    Follow up with DANNY 4 weeks.   Infusion scheduling note    Injection scheduling  note Prolia 4 weeks   Labs CBC, CMP and PSA   Scheduling:  Preferred lab:  Lab interval:     Imaging    Pharmacy appointment    Other referrals                     Supportive Plan Information  OP PROSTATE LEUPROLIDE Q3MO Valentin Mojica MD   Associated Diagnosis: Prostate cancer   noted on 8/30/2018   Line of treatment: First Line   Treatment goal: Palliative     Upcoming Treatment Dates - OP PROSTATE LEUPROLIDE Q3MO    4/10/2025       Chemotherapy       leuprolide (LUPRON) injection 22.5 mg  7/3/2025       Chemotherapy       leuprolide (LUPRON) injection 22.5 mg  9/25/2025       Chemotherapy       leuprolide (LUPRON) injection 22.5 mg  12/18/2025       Chemotherapy       leuprolide (LUPRON) injection 22.5 mg    Therapy Plan Information  DENOSUMAB (PROLIA) Q6M for Prostate cancer, noted on 8/30/2018  DENOSUMAB (PROLIA) Q6M for Osteoporosis, noted on 1/16/2025  Medications  denosumab (PROLIA) injection 60 mg  60 mg, Subcutaneous, Every 26 weeks      No therapy plan of the specified type found.    No therapy plan of the specified type found.         The above information has been reviewed with the patient and all questions have been answered to their apparent satisfaction.  They understand that they can call the clinic with any questions.    Valentin Mojica MD MPH  Staff Physician     [facility] [place] [address]  Email: [email or web address]  Phone: o) [phone] (c) [phone]
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
## 2025-01-16 NOTE — PATIENT INSTRUCTIONS
No current plan for surgery or radiation to the recurrent cancer in the prostate. Will proceed with hormone therapy to treat the cancer.     -- Lupron shot today  -- Prescription for darolutamide 600mg twice daily; sent prescription to [facility]  -- Will likely take a few weeks to to get the darolutamide as we deal with insurance etc  -- For osteoporosis, will have you start bone strenghtening shots in 4 weeks; continue vitamin D  -- Reschedule MRI of the prostate  -- Have urology follow up scheduled    -- Follow up with me in  4 weeks to see how you are doing

## 2025-01-24 ENCOUNTER — EXTERNAL HOME HEALTH (OUTPATIENT)
Dept: HOME HEALTH SERVICES | Facility: HOSPITAL | Age: 75
End: 2025-01-24
Payer: MEDICARE

## 2025-02-06 PROBLEM — J32.9 SINUSITIS: Status: ACTIVE | Noted: 2025-02-06

## 2025-02-13 ENCOUNTER — LAB VISIT (OUTPATIENT)
Dept: LAB | Facility: HOSPITAL | Age: 75
End: 2025-02-13
Payer: MEDICARE

## 2025-02-13 DIAGNOSIS — C61 PROSTATE CANCER: ICD-10-CM

## 2025-02-13 LAB
ALBUMIN SERPL BCP-MCNC: 3.6 G/DL (ref 3.5–5.2)
ALP SERPL-CCNC: 105 U/L (ref 40–150)
ALT SERPL W/O P-5'-P-CCNC: 9 U/L (ref 10–44)
ANION GAP SERPL CALC-SCNC: 10 MMOL/L (ref 8–16)
AST SERPL-CCNC: 16 U/L (ref 10–40)
BASOPHILS # BLD AUTO: 0.02 K/UL (ref 0–0.2)
BASOPHILS NFR BLD: 0.3 % (ref 0–1.9)
BILIRUB SERPL-MCNC: 0.4 MG/DL (ref 0.1–1)
BUN SERPL-MCNC: 12 MG/DL (ref 8–23)
CALCIUM SERPL-MCNC: 10 MG/DL (ref 8.7–10.5)
CHLORIDE SERPL-SCNC: 104 MMOL/L (ref 95–110)
CO2 SERPL-SCNC: 23 MMOL/L (ref 23–29)
COMPLEXED PSA SERPL-MCNC: 13.8 NG/ML (ref 0–4)
CREAT SERPL-MCNC: 0.9 MG/DL (ref 0.5–1.4)
DIFFERENTIAL METHOD BLD: ABNORMAL
EOSINOPHIL # BLD AUTO: 0.3 K/UL (ref 0–0.5)
EOSINOPHIL NFR BLD: 4 % (ref 0–8)
ERYTHROCYTE [DISTWIDTH] IN BLOOD BY AUTOMATED COUNT: 14.5 % (ref 11.5–14.5)
EST. GFR  (NO RACE VARIABLE): >60 ML/MIN/1.73 M^2
GLUCOSE SERPL-MCNC: 103 MG/DL (ref 70–110)
HCT VFR BLD AUTO: 38.2 % (ref 40–54)
HGB BLD-MCNC: 12 G/DL (ref 14–18)
IMM GRANULOCYTES # BLD AUTO: 0.05 K/UL (ref 0–0.04)
IMM GRANULOCYTES NFR BLD AUTO: 0.7 % (ref 0–0.5)
LYMPHOCYTES # BLD AUTO: 1.7 K/UL (ref 1–4.8)
LYMPHOCYTES NFR BLD: 23.3 % (ref 18–48)
MCH RBC QN AUTO: 30.2 PG (ref 27–31)
MCHC RBC AUTO-ENTMCNC: 31.4 G/DL (ref 32–36)
MCV RBC AUTO: 96 FL (ref 82–98)
MONOCYTES # BLD AUTO: 0.7 K/UL (ref 0.3–1)
MONOCYTES NFR BLD: 8.8 % (ref 4–15)
NEUTROPHILS # BLD AUTO: 4.7 K/UL (ref 1.8–7.7)
NEUTROPHILS NFR BLD: 62.9 % (ref 38–73)
NRBC BLD-RTO: 0 /100 WBC
PLATELET # BLD AUTO: 243 K/UL (ref 150–450)
PMV BLD AUTO: 9.9 FL (ref 9.2–12.9)
POTASSIUM SERPL-SCNC: 4 MMOL/L (ref 3.5–5.1)
PROT SERPL-MCNC: 8.5 G/DL (ref 6–8.4)
RBC # BLD AUTO: 3.98 M/UL (ref 4.6–6.2)
SODIUM SERPL-SCNC: 137 MMOL/L (ref 136–145)
WBC # BLD AUTO: 7.48 K/UL (ref 3.9–12.7)

## 2025-02-13 PROCEDURE — 36415 COLL VENOUS BLD VENIPUNCTURE: CPT | Performed by: INTERNAL MEDICINE

## 2025-02-13 PROCEDURE — 84153 ASSAY OF PSA TOTAL: CPT | Performed by: INTERNAL MEDICINE

## 2025-02-13 PROCEDURE — 80053 COMPREHEN METABOLIC PANEL: CPT | Performed by: INTERNAL MEDICINE

## 2025-02-13 PROCEDURE — 85025 COMPLETE CBC W/AUTO DIFF WBC: CPT | Performed by: INTERNAL MEDICINE

## 2025-02-14 ENCOUNTER — OFFICE VISIT (OUTPATIENT)
Dept: HEMATOLOGY/ONCOLOGY | Facility: CLINIC | Age: 75
End: 2025-02-14
Payer: MEDICARE

## 2025-02-14 ENCOUNTER — INFUSION (OUTPATIENT)
Dept: INFUSION THERAPY | Facility: HOSPITAL | Age: 75
End: 2025-02-14
Payer: MEDICARE

## 2025-02-14 VITALS
HEART RATE: 75 BPM | HEIGHT: 66 IN | DIASTOLIC BLOOD PRESSURE: 75 MMHG | BODY MASS INDEX: 20.04 KG/M2 | WEIGHT: 124.69 LBS | SYSTOLIC BLOOD PRESSURE: 100 MMHG | OXYGEN SATURATION: 98 %

## 2025-02-14 VITALS
RESPIRATION RATE: 18 BRPM | BODY MASS INDEX: 20.04 KG/M2 | OXYGEN SATURATION: 98 % | TEMPERATURE: 98 F | SYSTOLIC BLOOD PRESSURE: 102 MMHG | WEIGHT: 124.69 LBS | HEART RATE: 74 BPM | HEIGHT: 66 IN | DIASTOLIC BLOOD PRESSURE: 78 MMHG

## 2025-02-14 DIAGNOSIS — J01.10 ACUTE NON-RECURRENT FRONTAL SINUSITIS: ICD-10-CM

## 2025-02-14 DIAGNOSIS — Z79.899 LONG TERM CURRENT USE OF THERAPEUTIC DRUG: ICD-10-CM

## 2025-02-14 DIAGNOSIS — Z93.59 SUPRAPUBIC CATHETER: ICD-10-CM

## 2025-02-14 DIAGNOSIS — C61 PROSTATE CANCER: Primary | ICD-10-CM

## 2025-02-14 DIAGNOSIS — C34.32 PRIMARY ADENOCARCINOMA OF LOWER LOBE OF LEFT LUNG: ICD-10-CM

## 2025-02-14 DIAGNOSIS — M81.0 OSTEOPOROSIS, UNSPECIFIED OSTEOPOROSIS TYPE, UNSPECIFIED PATHOLOGICAL FRACTURE PRESENCE: ICD-10-CM

## 2025-02-14 DIAGNOSIS — Z79.818 ANDROGEN DEPRIVATION THERAPY: ICD-10-CM

## 2025-02-14 DIAGNOSIS — M81.0 OSTEOPOROSIS, UNSPECIFIED OSTEOPOROSIS TYPE, UNSPECIFIED PATHOLOGICAL FRACTURE PRESENCE: Primary | ICD-10-CM

## 2025-02-14 DIAGNOSIS — N36.0 URETHROCUTANEOUS FISTULA: ICD-10-CM

## 2025-02-14 PROCEDURE — 96374 THER/PROPH/DIAG INJ IV PUSH: CPT

## 2025-02-14 PROCEDURE — 25000003 PHARM REV CODE 250: Performed by: HOSPITALIST

## 2025-02-14 PROCEDURE — A4216 STERILE WATER/SALINE, 10 ML: HCPCS | Performed by: HOSPITALIST

## 2025-02-14 PROCEDURE — 63600175 PHARM REV CODE 636 W HCPCS: Performed by: HOSPITALIST

## 2025-02-14 PROCEDURE — 99999 PR PBB SHADOW E&M-EST. PATIENT-LVL III: CPT | Mod: PBBFAC,,, | Performed by: NURSE PRACTITIONER

## 2025-02-14 RX ORDER — SODIUM CHLORIDE 0.9 % (FLUSH) 0.9 %
10 SYRINGE (ML) INJECTION
Status: DISCONTINUED | OUTPATIENT
Start: 2025-02-14 | End: 2025-02-14 | Stop reason: HOSPADM

## 2025-02-14 RX ORDER — HEPARIN 100 UNIT/ML
500 SYRINGE INTRAVENOUS
Status: CANCELLED | OUTPATIENT
Start: 2025-02-14

## 2025-02-14 RX ORDER — SODIUM CHLORIDE 0.9 % (FLUSH) 0.9 %
10 SYRINGE (ML) INJECTION
OUTPATIENT
Start: 2025-02-14

## 2025-02-14 RX ORDER — ZOLEDRONIC ACID 5 MG/100ML
5 INJECTION, SOLUTION INTRAVENOUS
OUTPATIENT
Start: 2025-02-14

## 2025-02-14 RX ORDER — ZOLEDRONIC ACID 5 MG/100ML
5 INJECTION, SOLUTION INTRAVENOUS
Status: COMPLETED | OUTPATIENT
Start: 2025-02-14 | End: 2025-02-14

## 2025-02-14 RX ORDER — HEPARIN 100 UNIT/ML
500 SYRINGE INTRAVENOUS
OUTPATIENT
Start: 2025-02-14

## 2025-02-14 RX ADMIN — SODIUM CHLORIDE, PRESERVATIVE FREE 10 ML: 5 INJECTION INTRAVENOUS at 12:02

## 2025-02-14 RX ADMIN — SODIUM CHLORIDE: 9 INJECTION, SOLUTION INTRAVENOUS at 12:02

## 2025-02-14 RX ADMIN — ZOLEDRONIC ACID 5 MG: 5 INJECTION, SOLUTION INTRAVENOUS at 12:02

## 2025-02-14 NOTE — ASSESSMENT & PLAN NOTE
- missed MRI appt, now rescheduled.  Will review with urology and rad onc to consider salvage options. Will need to obtain prior radiation records. Started ADT 1/6/25. This could be a finite course in combination with salvage therapy or potneitally an indefinite course with further ARSI.   - MRI prostate; rescheduled   - Started q3mth leuprolide 1/2025 and darolutamide 2/2025  - Referral to cancer genetics  - Scheduled DEXA and start vitamin D    Orders:    MRI Prostate W W/O Contrast; Future

## 2025-02-14 NOTE — PROGRESS NOTES
[facility]: Established patient visit  Best Contact Phone Number(s): [phone] (home)       Cancer/Stage/TNM:    Cancer Staging   Primary adenocarcinoma of lower lobe of left lung  Staging form: Lung, AJCC 8th Edition  - Clinical stage from 10/4/2024: cT2a, cN0 - Unsigned        Reason for visit:  Locally recurrent prostate cancer    Molecular:  Germline Testing: Referred  Tempus xT: GNAS; [id number]; TET2    Treatment History:   2007      EBRT      Densitometery:  DEXA 12/23/24 - Osteoporosis         HPI:   Jorge Bourne is a 75 y.o. male with locally recurrent prostate cancer. Initially diagnosed ~2007, he reportedly underwent definitive RT therapy. Post treatment complicated by bladder outlet obstruction and urethro-cutaneous fistula requiring SPT placement 2016. He was lost to urologic followup, later found to have PSA 36.6 11/2024 in setting of workup for  stage IB lung cancer managed via SBRT. [facility] imaging 11/25/24 with locally recurrent disease but no beckie or distant metastases. He presents to  medical oncology clinic for initial evaluation.     Interval Events:  Presents to clinic alone for follow up of prostate cancer.  Currently being treated with both ADT + darolutamide.  Has started taking darolutamide as prescribed.  Tolerating medication well.  Denies hot flashes.  Energy level remains the same.      Recently evaluated for sinusitis in the ED 2/6/25. Received course of antibiotics, which he is still taking.  Feels symptoms are improving with medication. Denies sob and cough.    No signficant new concerns today. Denies signficant pain. SPT functioning.  Being exchanged monthly by home health nurse.  Deneis leakage about fistula.     Review of Systems   Constitutional:  Negative for chills, fever, malaise/fatigue and weight loss.   Respiratory:  Negative for cough and shortness of breath.    Cardiovascular:  Negative for chest pain, palpitations and leg swelling.    Gastrointestinal:  Negative for constipation, diarrhea, nausea and vomiting.   Genitourinary:  Negative for dysuria, flank pain, frequency, hematuria and urgency.   Neurological:  Negative for dizziness and weakness.       Physical Exam  Vitals reviewed.   Constitutional:       Appearance: Normal appearance. He is normal weight.   HENT:      Head: Normocephalic.      Nose: Nose normal.   Cardiovascular:      Rate and Rhythm: Normal rate and regular rhythm.      Pulses: Normal pulses.      Heart sounds: Normal heart sounds.   Pulmonary:      Effort: Pulmonary effort is normal. No respiratory distress.      Breath sounds: Normal breath sounds.   Abdominal:      General: Bowel sounds are normal.      Palpations: Abdomen is soft.   Musculoskeletal:         General: Normal range of motion.      Cervical back: Normal range of motion.      Right lower leg: No edema.      Left lower leg: No edema.   Skin:     General: Skin is warm and dry.      Capillary Refill: Capillary refill takes less than 2 seconds.   Neurological:      Mental Status: He is alert and oriented to person, place, and time. Mental status is at baseline.   Psychiatric:         Mood and Affect: Mood normal.       Plan:   Assessment & Plan  Prostate cancer  - missed MRI appt, now rescheduled.  Will review with urology and rad onc to consider salvage options. Will need to obtain prior radiation records. Started ADT 1/6/25. This could be a finite course in combination with salvage therapy or potneitally an indefinite course with further ARSI.   - MRI prostate; rescheduled   - Started q3mth leuprolide 1/2025 and darolutamide 2/2025  - Referral to cancer genetics  - Scheduled DEXA and start vitamin D    Orders:    MRI Prostate W W/O Contrast; Future    Primary adenocarcinoma of lower lobe of left lung         Long term current use of therapeutic drug         Androgen deprivation therapy         Osteoporosis, unspecified osteoporosis type, unspecified  pathological fracture presence         Urethrocutaneous fistula         Suprapubic catheter         Acute non-recurrent frontal sinusitis  - evaluated by ED 2/6/25  - treated with amoxicillin-clavulanate and benzonatate  - symptoms are improving            Will start Lupron today for non-metastatic local recurrence. Will intensify tehrapy per EMBARK. However, given seizure history, will not use enzlutamide. Prefer darolutamide. RX sent to [facility]. Will also start Prolia for osteoporosis. He is edentulous so can defer dental clearance.    -- Lupron today  -- Intensify with darolutamide per EMBARK - vicky contraindidcated ddue to seizure history  -- Edentulous. Osteoporosis. FU 4 weeks with Prolia  -- Reschedule MRI  -- Have him follow up with urology    Follow up:   Route Chart for Scheduling  Med Onc Route Chart for Scheduling       Supportive Plan Information  OP PROSTATE LEUPROLIDE Q3MO Valentin Mojica MD   Associated Diagnosis: Prostate cancer   noted on 8/30/2018   Line of treatment: First Line   Treatment goal: Palliative     Upcoming Treatment Dates - OP PROSTATE LEUPROLIDE Q3MO    4/10/2025       Chemotherapy       leuprolide (LUPRON) injection 22.5 mg  7/3/2025       Chemotherapy       leuprolide (LUPRON) injection 22.5 mg  9/25/2025       Chemotherapy       leuprolide (LUPRON) injection 22.5 mg  12/18/2025       Chemotherapy       leuprolide (LUPRON) injection 22.5 mg    Therapy Plan Information  ZOLEDRONIC ACID (RECLAST) VISIT for Osteoporosis, noted on 1/16/2025  Medications  zoledronic acid-mannitol & water 5 mg/100 mL infusion 5 mg  5 mg, Intravenous, Every visit  Flushes  heparin, porcine (PF) 100 unit/mL injection flush 500 Units  500 Units, Intravenous, PRN  sodium chloride 0.9% flush 10 mL  10 mL, Intravenous, Every visit  0.9% NaCl 100 mL flush bag  Intravenous, Every visit      No therapy plan of the specified type found.    No therapy plan of the specified type found.         The above information  has been reviewed with the patient and all questions have been answered to their apparent satisfaction.  They understand that they can call the clinic with any questions.

## 2025-02-14 NOTE — ASSESSMENT & PLAN NOTE
Had an MRI coming up in January, but missed appt. Will review with urology and rad onc to consider salvage options once obtained.    - MRI prostate rescheduled   - Started leuprolide q3mth 1/2025  - started darolutamide 2/2025  - Referral to cancer genetics  - CYNTHIA reviewed, continue vitamin D, zoledronic acid today

## 2025-02-14 NOTE — ASSESSMENT & PLAN NOTE
- evaluated by ED 2/6/25  - treated with amoxicillin-clavulanate and benzonatate  - symptoms are improving

## 2025-02-14 NOTE — ASSESSMENT & PLAN NOTE
- reviewed DEXA scan   - continue Vit D and calcium supplements  - zoledronic acid monthly   negative... clear

## 2025-02-14 NOTE — ASSESSMENT & PLAN NOTE
- evaluated ini ED  - prescribed amoxicillin-clavulanate and benzonatate for treatment  - symptoms improving, complete course of abx

## 2025-02-14 NOTE — PROGRESS NOTES
[facility]: New patient visit  Best Contact Phone Number(s): [phone] (home)       Cancer/Stage/TNM:    Cancer Staging   Primary adenocarcinoma of lower lobe of left lung  Staging form: Lung, AJCC 8th Edition  - Clinical stage from 10/4/2024: cT2a, cN0 - Unsigned        Reason for visit:  Locally recurrent prostate cancer    Molecular:  Germline Testing: N/A  Somatic Testing: N/A    Treatment History:   2007      EBRT           HPI:   Jorge Bourne is a 75 y.o. male with locally recurrent prostate cancer. Initially diagnosed ~2007, he reportedly underwent definitive RT therapy. Post treatment complicated by bladder outlet obstruction and urethro-cutaneous fistula requiring SPT placement 2016. He was lost to urologic followup, later found to have PSA 36.6 11/2024 in setting of workup for  stage IB lung cancer managed via SBRT. [facility] imaging 11/25/24 with locally recurrent disease but no beckie or distant metastases. He presents to  medical oncology clinic    Interval history:  Presents to clinic alone for follow up of prostate cancer.  Currently being treated with both ADT + darolutamide.  Has started taking darolutamide as prescribed.  Tolerating medication well.  Denies hot flashes.  Energy level remains the same.      Recently evaluated for sinusitis in the ED 2/6/25. Received course of antibiotics, which he is still taking.  Feels symptoms are improving with medication. Denies sob and cough.    No signficant new concerns today. Denies signficant pain. SPT functioning.  Being exchanged monthly by home health nurse.  Deneis leakage about fistula.           Lab Results   Component Value Date    PSADIAG 13.8 (H) 02/13/2025    PSADIAG 40.3 (H) 01/16/2025    PSADIAG 36.8 (H) 11/14/2024    PSA 4.8 (H) 02/22/2010    PSA 5.0 (H) 09/16/2009    PSA 4.8 12/17/2008         History has been obtained by chart review and discussion with the patient.     Oncology History   Prostate cancer   2007 Initial  Diagnosis    Initial diagnosis ~2007. Per reports received radiation at [facility] (Dr. Knight). He thinks he may have had concurrent hormonal therapy.      2/22/2010 Tumor Markers    02/22/2010  PSA 4.8     1/2015 Notable Event    Admission for acute urinary obstruction. Tramautic thomas insertion     12/2015 Notable Event    Incision and drainage of perineal abscess on 12/12/15     1/2016 -  Hospital Admission    January 2016 with a PE, and was noted to have leakage per his perineum, concern for urethrocutaneous fistula. Thomas catheter was placed over a wire at bedside and he was discharged home with this in place     4/2016 Procedure    Suprapubic catheter placement     11/14/2024 Tumor Markers    PSA 36.8     11/25/2024 Imaging Significant Findings    [facility] PET CT   Impression:  1. In this patient with elevated PSA there is radiotracer uptake within the prostate with regions of suspected extraprostatic extension of the radiotracer uptake involving the seminal vesicles and anterior rectal wall.  Consider further evaluation with dedicated prostate MRI to evaluate for extent of disease.  2. No radiotracer is avid disease elsewhere.  3. Similar size of biopsy proven left lower lobe lung adenocarcinoma.  4. Additional findings, as above.     Primary adenocarcinoma of lower lobe of left lung   4/2024 Imaging Significant Findings    04/22/24 CTA Chest  Impression:  1. No pulmonary embolism to the segmental level.  2. Somewhat nodular appearing left lower lobe opacity, suspicious for an infectious process versus a neoplasm.  Recommend further evaluation with 3 month follow-up chest CT, PET-CT, or tissue sampling.  3. Minimally prominent subcarinal lymph node, recommend attention on follow-up.  4. Small left pleural effusion.  5. Moderate emphysematous changes.     8/2024 Imaging Significant Findings      08/05/24 CT Chest  Impression:  - Persistence of nodular consolidation with cavitary change within the left lower lobe  abutting the major fissure with spiculated margin.  While there has not been a significant change in size of the nodular consolidation when compared to the prior examinations the persistence over this time interval increases risk of a malignant process.  Further evaluation with tissue sampling is recommended.  - Coronary artery atherosclerosis.  - Moderate emphysematous changes.     8/30/2024 Biopsy    08/30/24   1. LUNG, LEFT LOWER LOBE, TRANSBRONCHIAL BIOPSY:   Adenocarcinoma, well-differentiated   2. LUNG, LEFT LOWER LOBE, BAL-CYTOLOGY:   Negative for malignant cells.   3. LYMPH NODE, [id number] AND CELL BLOCK:   Negative for malignant cells.   Lymphocytes present   Comment   The lung biopsy shows a well-differenated adenocarcinoma, mucinous type.   Tissue will be sent for TEMPUS AND PDL1 testing.      10/4/2024 Initial Diagnosis    Primary adenocarcinoma of lower lobe of left lung      Initial Diagnosis       10/4/2024 Imaging Significant Findings    10/04/24 FDG PET CT  Impression:  - Left lower lobe cavitating lung mass with background metabolic activity compatible with recently diagnosed adenocarcinoma.  No hypermetabolic lesions elsewhere to suggest metastatic disease.  - Focal hypermetabolic activity in the distal esophagus without CT correlate lesion, which can be seen in esophagitis.     11/14/2024 Imaging Significant Findings    11/14/24: CT Chest  Impression:  - Slightly enlarged 3.4 cm cavitary mass in the left lower lobe, compatible with biopsy-proven adenocarcinoma.  It broadly abuts the left major fissure, but does not extend into the left upper lobe.  - No lymphadenopathy.  - Few micro nodules in the right lung, which are unchanged and likely benign.        12/11/2024 - 12/17/2024 Radiation Therapy    Treating physician: Raoul Paez  Treatment Summary  Course: C1 Thorax 2024    Treatment Site Ref. ID Energy Dose/Fx (Gy) #Fx Dose Correction (Gy) Total Dose (Gy) Start Date End Date Elapsed Days    SB LLL PTV 6X 11 5 / 5 0 55 12/11/2024 12/17/2024 6              Past Medical History:   Diagnosis Date    Cancer     prostate    Seizures          Past Surgical History:   Procedure Laterality Date    BRAIN SURGERY      COLONOSCOPY N/A 1/31/2024    Procedure: COLONOSCOPY;  Surgeon: Robert Nuñez MD;  Location: [facility] ENDO;  Service: Endoscopy;  Laterality: N/A;    COLONOSCOPY W/ POLYPECTOMY  01/31/2024    5 poylps; large with 3 clips    ENDOBRONCHIAL ULTRASOUND N/A 8/30/2024    Procedure: ENDOBRONCHIAL ULTRASOUND (EBUS);  Surgeon: Torres Pagan MD;  Location: [facility] OR 2ND FLR;  Service: Pulmonary;  Laterality: N/A;    FRACTURE SURGERY      left femur hardware    PROSTATE SURGERY      ROBOTIC BRONCHOSCOPY N/A 5/31/2024    Procedure: ROBOTIC BRONCHOSCOPY;  Surgeon: Torres Pagan MD;  Location: [facility] OR 2ND FLR;  Service: Pulmonary;  Laterality: N/A;    ROBOTIC BRONCHOSCOPY N/A 8/30/2024    Procedure: ROBOTIC BRONCHOSCOPY;  Surgeon: Torres Pagan MD;  Location: [facility] OR 2ND FLR;  Service: Pulmonary;  Laterality: N/A;         Review of patient's allergies indicates:  No Known Allergies      Current Outpatient Medications   Medication Sig Dispense Refill    amoxicillin-clavulanate 875-125mg (AUGMENTIN) 875-125 mg per tablet Take 1 tablet by mouth every 12 (twelve) hours. for 10 days 20 tablet 0    benzonatate (TESSALON) 200 MG capsule Take 1 capsule (200 mg total) by mouth 3 (three) times daily as needed. 30 capsule 0    cetirizine (ZYRTEC) 10 MG tablet Take 1 tablet (10 mg total) by mouth once daily. 30 tablet 0    cholecalciferol, vitamin D3, (VITAMIN D3) 25 mcg (1,000 unit) capsule Take 1 capsule (1,000 Units total) by mouth once daily. 30 capsule 11    darolutamide 300 mg Tab Take 2 tablets (600 mg) by mouth 2 (two) times a day. 120 tablet 5    fluticasone propionate (FLONASE) 50 mcg/actuation nasal spray 1 spray (50 mcg total) by Each Nostril route 2 (two) times daily as needed. 15 g 0     "promethazine-dextromethorphan (PROMETHAZINE-DM) 6.25-15 mg/5 mL Syrp Take 5 mLs by mouth nightly as needed (cough). 180 mL 0     Current Facility-Administered Medications   Medication Dose Route Frequency Provider Last Rate Last Admin    cyanocobalamin injection 1,000 mcg  1,000 mcg Intramuscular Q7 Days René Bauer MD   1,000 mcg at 07/29/19 0856     Facility-Administered Medications Ordered in Other Visits   Medication Dose Route Frequency Provider Last Rate Last Admin    0.9%  NaCl infusion   Intravenous Continuous Robert Nuñez MD        sodium chloride 0.9% flush 10 mL  10 mL Intravenous PRN Valentin Mojica MD   10 mL at 02/14/25 1231     Review of Systems   Constitutional:  Negative for chills, fever, malaise/fatigue and weight loss.        Denies hot flashes   Respiratory:  Negative for shortness of breath.    Cardiovascular:  Negative for chest pain, palpitations and leg swelling.   Gastrointestinal:  Negative for constipation and diarrhea.   Genitourinary:  Negative for dysuria, flank pain, frequency, hematuria and urgency.   Neurological:  Negative for dizziness and weakness.          Objective:      Physical Exam:   /75 (Patient Position: Sitting)   Pulse 75   Ht 5' 6" (1.676 m)   Wt 56.5 kg (124 lb 10.7 oz)   SpO2 98%   BMI 20.12 kg/m²       ECOG Performance status: (2) Ambulatory and capable of self care, unable to carry out work activity, up and about > 50% or waking hours     Physical Exam  Constitutional:       General: He is not in acute distress.     Appearance: Normal appearance.   HENT:      Head: Normocephalic.   Eyes:      General: No scleral icterus.     Extraocular Movements: Extraocular movements intact.      Conjunctiva/sclera: Conjunctivae normal.   Cardiovascular:      Rate and Rhythm: Normal rate and regular rhythm.      Pulses: Normal pulses.      Heart sounds: Normal heart sounds.   Pulmonary:      Effort: Pulmonary effort is normal. No respiratory distress. "      Breath sounds: Normal breath sounds.   Abdominal:      General: Bowel sounds are normal. There is no distension.      Palpations: Abdomen is soft.   Musculoskeletal:      Cervical back: Normal range of motion and neck supple.      Right lower leg: No edema.      Left lower leg: No edema.   Skin:     General: Skin is warm and dry.   Neurological:      Mental Status: He is alert and oriented to person, place, and time.      Motor: No weakness.   Psychiatric:         Mood and Affect: Mood normal.         Behavior: Behavior normal.         Thought Content: Thought content normal.          Recent Labs:   Lab Results   Component Value Date    WBC 7.48 02/13/2025    RBC 3.98 (L) 02/13/2025    HGB 12.0 (L) 02/13/2025    HCT 38.2 (L) 02/13/2025     02/13/2025     02/13/2025    K 4.0 02/13/2025     02/13/2025    CO2 23 02/13/2025     02/13/2025    BUN 12 02/13/2025    CREATININE 0.9 02/13/2025    CALCIUM 10.0 02/13/2025    PHOS 2.6 (L) 01/21/2015    BILITOT 0.4 02/13/2025    AST 16 02/13/2025    ALT 9 (L) 02/13/2025          Lab Results   Component Value Date    PSADIAG 13.8 (H) 02/13/2025    PSADIAG 40.3 (H) 01/16/2025    PSADIAG 36.8 (H) 11/14/2024    PSA 4.8 (H) 02/22/2010    PSA 5.0 (H) 09/16/2009    PSA 4.8 12/17/2008        Cardiovascular Screening:  Primary care physician: Hossein Olivas MD      The ASCVD Risk score (Elliott DK, et al., 2019) failed to calculate for the following reasons:    Cannot find a previous HDL lab    Cannot find a previous total cholesterol lab    EKG:   Results for orders placed or performed during the hospital encounter of 04/22/24   EKG 12-lead    Collection Time: 04/22/24 11:23 AM   Result Value Ref Range    QRS Duration 86 ms    [facility] QTC Calculation 439 ms    Narrative    Test Reason : R07.9,    Vent. Rate : 064 BPM     Atrial Rate : 064 BPM     P-R Int : 154 ms          QRS Dur : 086 ms      QT Int : 426 ms       P-R-T Axes : 076 048 040 degrees     QTc  Int : 439 ms    Normal sinus rhythm  Possible Anterior infarct ,age undetermined  Abnormal ECG    Confirmed by Renae Acevedo MD ([id number]) on 4/22/2024 1:12:05 PM    Referred By:             Confirmed By:Renae Acevedo MD       Body mass index is 20.12 kg/m².    Lab Results   Component Value Date    CHOL 184 03/21/2019    LDLCALC 128 (H) 03/21/2019    HDL 36 (L) 03/21/2019    TRIG 99 03/21/2019          Bone Health    Lab Results   Component Value Date    [id number] 37 01/16/2025        No results found for this or any previous visit.         [facility] PET IMAGING+     No results found for this or any previous visit.       I have personally reviewed the above imaging.     Path:   Reviewed pathology as documented above.      Diagnoses:     1. Prostate cancer    2. Primary adenocarcinoma of lower lobe of left lung    3. Long term current use of therapeutic drug    4. Androgen deprivation therapy    5. Osteoporosis, unspecified osteoporosis type, unspecified pathological fracture presence    6. Urethrocutaneous fistula    7. Suprapubic catheter    8. Acute non-recurrent frontal sinusitis          Assessment and Plan:     1. Prostate cancer  Overview:  Locally recurrent prostate cancer. Initially diagnosed ~2007, he reportedly underwent definitive RT therapy. Post treatment complicated by bladder outlet obstruction and urethro-cutaneous fistula requiring SPT placement 2016. He was lost to urologic followup, later found to have PSA 36.6 11/2024 in setting of workup for  stage IB lung cancer managed via SBRT. [facility] imaging 11/25/24 with locally recurrent disease but no beckie or distant metastases.    Assessment & Plan:  Had an MRI coming up in January, but missed appt. Will review with urology and rad onc to consider salvage options once obtained.    - MRI prostate rescheduled   - Started leuprolide q3mth 1/2025  - started darolutamide 2/2025  - Referral to cancer genetics  - DEXA reviewed, continue vitamin D, zoledronic acid  today    Orders:  -     MRI Prostate W W/O Contrast; Future; Expected date: 02/14/2025    2. Primary adenocarcinoma of lower lobe of left lung  Assessment & Plan:  - Currently undergoing SBRT with Dr. Vann  - Surveillance per rad onc      3. Long term current use of therapeutic drug    4. Androgen deprivation therapy    5. Osteoporosis, unspecified osteoporosis type, unspecified pathological fracture presence  Overview:  The L1 to L4 vertebral bone mineral density is equal to0.818 g/cm squared with a T score of -2.5.  The left femoral neck bone mineral density is equal to 0.560 g/cm squared with a T score of -2.7.  There is a 14% risk of a major osteoporotic fracture and a 6.3% risk of hip fracture in the next 10 years (FRAX).    Assessment & Plan:  - reviewed DEXA scan   - continue Vit D and calcium supplements  - zoledronic acid monthly      6. Urethrocutaneous fistula  Overview:  Requires SPT. Has occasional prograde flow of urine through his urethra which is painful.  Has also had recurrent infections of the SPT; had prolonged hospitalization with urospsis at [facility]. Home nursing exchanges SPT about once per month. Follows with urology.       7. Suprapubic catheter  Overview:  In setting of urehtrocutaneous fistula 2016    Assessment & Plan:  - draining appropriately  - being exchanged by home health monthly      8. Acute non-recurrent frontal sinusitis  Assessment & Plan:  - evaluated ini ED  - prescribed amoxicillin-clavulanate and benzonatate for treatment  - symptoms improving, complete course of abx            Follow up:   Route Chart for Scheduling    Med Onc Chart Routing      Follow up with physician 2 months. RTC with labs (cbc, cmp, psa), leurpolide, zoledronic acid, and to see Dr. Mojica   Follow up with DANNY    Infusion scheduling note   zoledronic acid monthly   Injection scheduling note leuprolide 4/16/2025   Labs CBC, CMP and PSA   Scheduling:  Preferred lab:  Lab interval: every 4 weeks  cbc,  cmp, psa   Imaging    Pharmacy appointment    Other referrals                     Supportive Plan Information  OP PROSTATE LEUPROLIDE Q3MO Valentin Mojica MD   Associated Diagnosis: Prostate cancer   noted on 8/30/2018   Line of treatment: First Line   Treatment goal: Palliative     Upcoming Treatment Dates - OP PROSTATE LEUPROLIDE Q3MO    4/10/2025       Chemotherapy       leuprolide (LUPRON) injection 22.5 mg  7/3/2025       Chemotherapy       leuprolide (LUPRON) injection 22.5 mg  9/25/2025       Chemotherapy       leuprolide (LUPRON) injection 22.5 mg  12/18/2025       Chemotherapy       leuprolide (LUPRON) injection 22.5 mg    Therapy Plan Information  ZOLEDRONIC ACID (RECLAST) VISIT for Osteoporosis, noted on 1/16/2025  Medications  zoledronic acid-mannitol & water 5 mg/100 mL infusion 5 mg  5 mg, Intravenous, Every visit  Flushes  heparin, porcine (PF) 100 unit/mL injection flush 500 Units  500 Units, Intravenous, PRN  sodium chloride 0.9% flush 10 mL  10 mL, Intravenous, Every visit  0.9% NaCl 100 mL flush bag  Intravenous, Every visit      No therapy plan of the specified type found.    No therapy plan of the specified type found.         The above information has been reviewed with the patient and all questions have been answered to their apparent satisfaction.  They understand that they can call the clinic with any questions.       Melissa Voltz, APRN [facility] [address] [address]  Phone: (o) [phone]

## 2025-02-26 ENCOUNTER — HOSPITAL ENCOUNTER (OUTPATIENT)
Dept: RADIOLOGY | Facility: HOSPITAL | Age: 75
Discharge: HOME | End: 2025-02-26
Attending: NURSE PRACTITIONER
Payer: MEDICARE

## 2025-02-26 DIAGNOSIS — C61 PROSTATE CANCER: ICD-10-CM

## 2025-02-26 PROCEDURE — A9585 GADOBUTROL INJECTION: HCPCS | Performed by: NURSE PRACTITIONER

## 2025-02-26 PROCEDURE — 72197 MRI PELVIS W/O & W/DYE: CPT | Mod: TC

## 2025-02-26 PROCEDURE — 72197 MRI PELVIS W/O & W/DYE: CPT | Mod: 26,,, | Performed by: RADIOLOGY

## 2025-02-26 PROCEDURE — 25500020 PHARM REV CODE 255: Performed by: NURSE PRACTITIONER

## 2025-02-26 RX ORDER — GADOBUTROL 604.72 MG/ML
6 INJECTION INTRAVENOUS
Status: COMPLETED | OUTPATIENT
Start: 2025-02-26 | End: 2025-02-26

## 2025-02-26 RX ADMIN — GADOBUTROL 6 ML: 604.72 INJECTION INTRAVENOUS at 05:02

## 2025-02-27 ENCOUNTER — PATIENT MESSAGE (OUTPATIENT)
Dept: HEMATOLOGY/ONCOLOGY | Facility: CLINIC | Age: 75
End: 2025-02-27
Payer: MEDICARE

## 2025-03-10 ENCOUNTER — OFFICE VISIT (OUTPATIENT)
Dept: UROLOGY | Facility: CLINIC | Age: 75
End: 2025-03-10
Payer: MEDICARE

## 2025-03-10 VITALS
HEART RATE: 60 BPM | WEIGHT: 123.25 LBS | SYSTOLIC BLOOD PRESSURE: 170 MMHG | HEIGHT: 66 IN | DIASTOLIC BLOOD PRESSURE: 75 MMHG | BODY MASS INDEX: 19.81 KG/M2

## 2025-03-10 DIAGNOSIS — C61 BIOCHEMICALLY RECURRENT MALIGNANT NEOPLASM OF PROSTATE: Primary | ICD-10-CM

## 2025-03-10 DIAGNOSIS — Z93.59 SUPRAPUBIC CATHETER: ICD-10-CM

## 2025-03-10 DIAGNOSIS — R97.21 BIOCHEMICALLY RECURRENT MALIGNANT NEOPLASM OF PROSTATE: Primary | ICD-10-CM

## 2025-03-10 PROCEDURE — 3077F SYST BP >= 140 MM HG: CPT | Mod: CPTII,S$GLB,, | Performed by: STUDENT IN AN ORGANIZED HEALTH CARE EDUCATION/TRAINING PROGRAM

## 2025-03-10 PROCEDURE — 1126F AMNT PAIN NOTED NONE PRSNT: CPT | Mod: CPTII,S$GLB,, | Performed by: STUDENT IN AN ORGANIZED HEALTH CARE EDUCATION/TRAINING PROGRAM

## 2025-03-10 PROCEDURE — 3078F DIAST BP <80 MM HG: CPT | Mod: CPTII,S$GLB,, | Performed by: STUDENT IN AN ORGANIZED HEALTH CARE EDUCATION/TRAINING PROGRAM

## 2025-03-10 PROCEDURE — 99215 OFFICE O/P EST HI 40 MIN: CPT | Mod: S$GLB,,, | Performed by: STUDENT IN AN ORGANIZED HEALTH CARE EDUCATION/TRAINING PROGRAM

## 2025-03-10 PROCEDURE — 99999 PR PBB SHADOW E&M-EST. PATIENT-LVL III: CPT | Mod: PBBFAC,,, | Performed by: STUDENT IN AN ORGANIZED HEALTH CARE EDUCATION/TRAINING PROGRAM

## 2025-03-10 PROCEDURE — 1159F MED LIST DOCD IN RCRD: CPT | Mod: CPTII,S$GLB,, | Performed by: STUDENT IN AN ORGANIZED HEALTH CARE EDUCATION/TRAINING PROGRAM

## 2025-03-10 NOTE — PROGRESS NOTES
"[facility] Urology [facility] [address]   Clinic Note    SUBJECTIVE:     Chief Complaint: prostate cancer    History of Present Illness:  Jorge Bourne is a 75 y.o. male who presents to clinic for prostate cancer. He is new to our clinic referred by No ref. provider found.     Has chronic urinary retention managed with SP tube.  Underwent EBRT for prostate cancer in 2007. PSA 11/2024 noted to be 36.8; none prior since 2010 (was 5.1 then; PSADT 5 years.) PSMA showed radiotracer uptake in prostate, potentially extending to seminal vesicles and anterior rectal wall. No sites of distant metastasis or lymph node spread.   In 08/2024 underwent biopsy of lung mass - diagnosed with primary lung cancer.    Saw Heme-Onc, was started on Lupron and darolutamide. MRI shows lesion suspicious for local recurrence, +SVI but no anterior rectal wall involvement.  PSA was 40, now decreased to 13.8 after starting treatment.      Anticoagulation:  No    OBJECTIVE:     Estimated body mass index is 19.89 kg/m² as calculated from the following:    Height as of this encounter: 5' 6" (1.676 m).    Weight as of this encounter: 55.9 kg (123 lb 3.8 oz).    Vital Signs (Most Recent)  Pulse: 60 (03/10/25 1004)  BP: (!) 170/75 (03/10/25 1004)    Physical Exam  Vitals reviewed.   Constitutional:       Appearance: Normal appearance.   HENT:      Head: Normocephalic and atraumatic.   Eyes:      Conjunctiva/sclera: Conjunctivae normal.   Pulmonary:      Effort: Pulmonary effort is normal.   Abdominal:      General: Abdomen is flat. There is no distension.      Tenderness: There is no abdominal tenderness.   Genitourinary:     Comments: SP tube draining clear yellow urine  Skin:     General: Skin is warm and dry.   Neurological:      General: No focal deficit present.      Mental Status: He is alert and oriented to person, place, and time.   Psychiatric:         Mood and Affect: Mood normal.         Behavior: Behavior normal.         Thought Content: Thought " content normal.         Judgment: Judgment normal.         Lab Results   Component Value Date    BUN 12 02/13/2025    CREATININE 0.9 02/13/2025    WBC 7.48 02/13/2025    HGB 12.0 (L) 02/13/2025    HCT 38.2 (L) 02/13/2025     02/13/2025    AST 16 02/13/2025    ALT 9 (L) 02/13/2025    ALKPHOS 105 02/13/2025    ALBUMIN 3.6 02/13/2025        Lab Results   Component Value Date    PSA 4.8 (H) 02/22/2010    PSA 5.0 (H) 09/16/2009    PSA 4.8 12/17/2008    PSADIAG 13.8 (H) 02/13/2025    PSADIAG 40.3 (H) 01/16/2025    PSADIAG 36.8 (H) 11/14/2024     Imaging    See HPI. I have independently reviewed and interpreted the images.      ASSESSMENT     1. Biochemically recurrent malignant neoplasm of prostate    2. Suprapubic catheter        PLAN:   1. Biochemically recurrent malignant neoplasm of prostate  -     POCT urine dipstick without microscope    2. Suprapubic catheter  Overview:  In setting of urehtrocutaneous fistula 2016    Orders:  -     POCT urine dipstick without microscope         Continue medical management of prostate cancer (locally recurrent after EBRT) with Heme-Onc. Currently on Lupron and darolutamide.  Continue monthly SPT exchanges at home.  RTC PRN    Jaime Sandoval MD

## 2025-03-11 ENCOUNTER — DOCUMENT SCAN (OUTPATIENT)
Dept: HOME HEALTH SERVICES | Facility: HOSPITAL | Age: 75
End: 2025-03-11
Payer: MEDICARE

## 2025-03-17 ENCOUNTER — RESULTS FOLLOW-UP (OUTPATIENT)
Dept: HEMATOLOGY/ONCOLOGY | Facility: CLINIC | Age: 75
End: 2025-03-17

## 2025-03-17 NOTE — PROGRESS NOTES
"[facility] Follow Up Note    Assessment:  Jorge Bourne is a 75 y.o. male with a group stage IB, [id number] adenocarcinoma of the LLL s/p SBRT to 55 Gy in 5 fractions, completed 12/17/24.  Recurrent prostate cancer, on ADT with med onc.   CT chest with response on personal review.   ECOG: (1) Restricted in physically strenuous activity, ambulatory and able to do work of light nature        Plan:  Follow up final read  Tentative plan to RTC 4 months with CT chest.   HTN: no headaches, CP, SOB. He will check again at home. If remains high, he will discuss with his PCP      Oncologic History:  He has a history of seizure and prostate cancer s/p radiation?? completed 2008 they believe at [facility].  Has suprapubic catheter for recurrent infections and incontinence?  4/22/24: CTA Chest for cough demonstrated  nodular appearing LLL opacity, suspicious for neoplasm. 1cm subcarinal LN  5/24/24: CT chest WO: stable LLL nodule with cavitary changes. No mediastinal or hilar adenopathy.   8/5/24: CT chest WO: stable nodular consolidation with cavitary changes in the LLL, abutting the major fissure, measuring 3cm and 4cm cc on personal reivew. No hilar or mediastinal adenopathy but limited by non contrast imaging.   8/30/24: Bronch and EBUS  Op note: no endobronchial lesions. 2.3 cm LLL nodule biopsied. 11L sampled. No path appearing LN at 11R, 4R, 7, 4L  Path: DENIZ well differentiated adenocarcinoma mucinous type. "Lymph node" (11L per op note) negative for malignancy with lymphocytes present.    KRAS G12D  PDL1 pending  9/27/24: MRI brain GINA, with moderate to large right temporal encephalomalacia subjacent to craniotomy which may be sequela of prior lesion resection   11/14/24: PSA 36.8  11/25/24: PSMA PET with In this patient with elevated PSA there is radiotracer uptake within the prostate with regions of suspected extraprostatic extension of the radiotracer uptake involving the seminal vesicles and anterior " rectal wall no radiotracer avid disease elsewhere. Similar size LLL adenocarcinoma.  12/11/24 - 12/17/24: SBRT 55 Gy in 5 fractions to the LLL  1/16/2025: started lupron and darolutamide with med onc for prostate cancer.   3/14/25: PSA 2      Possibility of pregnancy: N/A  History of prior irradiation: Yes - prostate only per wife and as above.   History of prior systemic anti-cancer therapy: No  History of collagen vascular disease: No  Implanted electronic device (pacer/defib/nerve stimulator): No     History of Present Illness:  Jorge Bourne presents today for follow up.     No complaints. On ADT for prostate cancer. Here with daughter. Notably no worsening cough, CP, SOB, hemoptysis. CW pain    Review of Systems:  ROS as above    Social History:  Social History     Tobacco Use    Smoking status: Former     Types: Cigarettes     Start date: 2018   Substance Use Topics    Alcohol use: Yes     Comment: every now and then    Drug use: No       Past Medical History:  Past Medical History:   Diagnosis Date    Cancer     prostate    Seizures        Past Surgical History:   Procedure Laterality Date    BRAIN SURGERY      COLONOSCOPY N/A 1/31/2024    Procedure: COLONOSCOPY;  Surgeon: Robert Nuñez MD;  Location: [facility];  Service: Endoscopy;  Laterality: N/A;    COLONOSCOPY W/ POLYPECTOMY  01/31/2024    5 poylps; large with 3 clips    ENDOBRONCHIAL ULTRASOUND N/A 8/30/2024    Procedure: ENDOBRONCHIAL ULTRASOUND (EBUS);  Surgeon: Torres Pagan MD;  Location: [facility] [place] [address] [place] [address];  Service: Pulmonary;  Laterality: N/A;    FRACTURE SURGERY      left femur hardware    PROSTATE SURGERY      ROBOTIC BRONCHOSCOPY N/A 5/31/2024    Procedure: ROBOTIC BRONCHOSCOPY;  Surgeon: Torres Pagan MD;  [facility];  Service: Pulmonary;  Laterality: N/A;    ROBOTIC BRONCHOSCOPY N/A 8/30/2024    Procedure: ROBOTIC BRONCHOSCOPY;  Surgeon: Torres Pagan MD;  [facility];  Service: Pulmonary;   "Laterality: N/A;         Medications:  Current Outpatient Medications on File Prior to Visit   Medication Sig Dispense Refill    cetirizine (ZYRTEC) 10 MG tablet Take 1 tablet (10 mg total) by mouth once daily. 30 tablet 0    cholecalciferol, vitamin D3, (VITAMIN D3) 25 mcg (1,000 unit) capsule Take 1 capsule (1,000 Units total) by mouth once daily. 30 capsule 11    darolutamide 300 mg Tab Take 2 tablets (600 mg) by mouth 2 (two) times a day. 120 tablet 5    fluticasone propionate (FLONASE) 50 mcg/actuation nasal spray 1 spray (50 mcg total) by Each Nostril route 2 (two) times daily as needed. 15 g 0     Current Facility-Administered Medications on File Prior to Visit   Medication Dose Route Frequency Provider Last Rate Last Admin    0.9%  NaCl infusion   Intravenous Continuous Robert Nuñez MD        cyanocobalamin injection 1,000 mcg  1,000 mcg Intramuscular Q7 Days René Bauer MD   1,000 mcg at 07/29/19 0856    [COMPLETED] iohexoL (OMNIPAQUE 350) injection 75 mL  75 mL Intravenous ONCE PRN Monroe Paez Jr., MD   75 mL at 03/19/25 1254       Allergies:  Review of patient's allergies indicates:  No Known Allergies    Exam:  Vitals:    03/19/25 1312   BP: (!) 190/84   Patient Position: Sitting   Pulse: (!) 58   Temp: 97 °F (36.1 °C)   SpO2: 99%   Weight: 56.1 kg (123 lb 10.9 oz)   Height: 5' 6" (1.676 m)       Constitutional: Pleasant 75 y.o. male in no acute distress.  Well nourished. Well groomed.   HEENT: Normocephalic and atraumatic   Cardiovascular: Upper extremities warm to touch  Lungs: No audible wheezing.  Normal effort.   Musculoskeletal: No gross MSK deformities. Ambulates without assistance  Skin: No rashes appreciated.  Psych: Alert and oriented with appropriate mood and affect.  Neuro:  Grossly normal.    Data Review:  Information obtained from Jorge Bourne and via chart review.     Independent Interpretation of Test(s): CT chest from today was personally reviewed as " above.          Monroe Paez MD  Radiation Oncology

## 2025-03-19 ENCOUNTER — HOSPITAL ENCOUNTER (OUTPATIENT)
Dept: RADIOLOGY | Facility: HOSPITAL | Age: 75
Discharge: HOME | End: 2025-03-19
Attending: STUDENT IN AN ORGANIZED HEALTH CARE EDUCATION/TRAINING PROGRAM
Payer: MEDICARE

## 2025-03-19 ENCOUNTER — OFFICE VISIT (OUTPATIENT)
Dept: RADIATION ONCOLOGY | Facility: CLINIC | Age: 75
End: 2025-03-19
Payer: MEDICARE

## 2025-03-19 VITALS
WEIGHT: 123.69 LBS | DIASTOLIC BLOOD PRESSURE: 84 MMHG | OXYGEN SATURATION: 99 % | TEMPERATURE: 97 F | HEIGHT: 66 IN | BODY MASS INDEX: 19.88 KG/M2 | SYSTOLIC BLOOD PRESSURE: 190 MMHG | HEART RATE: 58 BPM

## 2025-03-19 DIAGNOSIS — C34.32 PRIMARY ADENOCARCINOMA OF LOWER LOBE OF LEFT LUNG: ICD-10-CM

## 2025-03-19 DIAGNOSIS — Z09 RADIOTHERAPY FOLLOW-UP: ICD-10-CM

## 2025-03-19 DIAGNOSIS — C34.90 MALIGNANT NEOPLASM OF UNSPECIFIED PART OF UNSPECIFIED BRONCHUS OR LUNG: Primary | ICD-10-CM

## 2025-03-19 PROCEDURE — 3079F DIAST BP 80-89 MM HG: CPT | Mod: CPTII,S$GLB,, | Performed by: STUDENT IN AN ORGANIZED HEALTH CARE EDUCATION/TRAINING PROGRAM

## 2025-03-19 PROCEDURE — 99999 PR PBB SHADOW E&M-EST. PATIENT-LVL III: CPT | Mod: PBBFAC,,, | Performed by: STUDENT IN AN ORGANIZED HEALTH CARE EDUCATION/TRAINING PROGRAM

## 2025-03-19 PROCEDURE — 99213 OFFICE O/P EST LOW 20 MIN: CPT | Mod: S$GLB,,, | Performed by: STUDENT IN AN ORGANIZED HEALTH CARE EDUCATION/TRAINING PROGRAM

## 2025-03-19 PROCEDURE — 71260 CT THORAX DX C+: CPT | Mod: 26,,, | Performed by: RADIOLOGY

## 2025-03-19 PROCEDURE — 1126F AMNT PAIN NOTED NONE PRSNT: CPT | Mod: CPTII,S$GLB,, | Performed by: STUDENT IN AN ORGANIZED HEALTH CARE EDUCATION/TRAINING PROGRAM

## 2025-03-19 PROCEDURE — 25500020 PHARM REV CODE 255: Performed by: STUDENT IN AN ORGANIZED HEALTH CARE EDUCATION/TRAINING PROGRAM

## 2025-03-19 PROCEDURE — 3077F SYST BP >= 140 MM HG: CPT | Mod: CPTII,S$GLB,, | Performed by: STUDENT IN AN ORGANIZED HEALTH CARE EDUCATION/TRAINING PROGRAM

## 2025-03-19 PROCEDURE — 71260 CT THORAX DX C+: CPT | Mod: TC

## 2025-03-19 PROCEDURE — 1159F MED LIST DOCD IN RCRD: CPT | Mod: CPTII,S$GLB,, | Performed by: STUDENT IN AN ORGANIZED HEALTH CARE EDUCATION/TRAINING PROGRAM

## 2025-03-19 RX ADMIN — IOHEXOL 75 ML: 350 INJECTION, SOLUTION INTRAVENOUS at 12:03

## 2025-03-21 ENCOUNTER — RESULTS FOLLOW-UP (OUTPATIENT)
Dept: RADIATION ONCOLOGY | Facility: CLINIC | Age: 75
End: 2025-03-21

## 2025-04-04 ENCOUNTER — EXTERNAL HOME HEALTH (OUTPATIENT)
Dept: HOME HEALTH SERVICES | Facility: HOSPITAL | Age: 75
End: 2025-04-04
Payer: MEDICARE

## 2025-04-07 ENCOUNTER — DOCUMENT SCAN (OUTPATIENT)
Dept: HOME HEALTH SERVICES | Facility: HOSPITAL | Age: 75
End: 2025-04-07
Payer: MEDICARE

## 2025-04-16 ENCOUNTER — INFUSION (OUTPATIENT)
Dept: INFUSION THERAPY | Facility: HOSPITAL | Age: 75
End: 2025-04-16
Payer: MEDICARE

## 2025-04-16 ENCOUNTER — OFFICE VISIT (OUTPATIENT)
Dept: HEMATOLOGY/ONCOLOGY | Facility: CLINIC | Age: 75
End: 2025-04-16
Payer: MEDICARE

## 2025-04-16 VITALS
HEART RATE: 64 BPM | OXYGEN SATURATION: 98 % | BODY MASS INDEX: 19.81 KG/M2 | TEMPERATURE: 97 F | DIASTOLIC BLOOD PRESSURE: 74 MMHG | HEIGHT: 66 IN | WEIGHT: 123.25 LBS | RESPIRATION RATE: 16 BRPM | SYSTOLIC BLOOD PRESSURE: 110 MMHG

## 2025-04-16 DIAGNOSIS — M81.8 OTHER OSTEOPOROSIS WITHOUT CURRENT PATHOLOGICAL FRACTURE: ICD-10-CM

## 2025-04-16 DIAGNOSIS — C61 PROSTATE CANCER: ICD-10-CM

## 2025-04-16 DIAGNOSIS — C34.32 PRIMARY ADENOCARCINOMA OF LOWER LOBE OF LEFT LUNG: ICD-10-CM

## 2025-04-16 DIAGNOSIS — Z79.818 ENCOUNTER FOR MONITORING ANDROGEN DEPRIVATION THERAPY: ICD-10-CM

## 2025-04-16 DIAGNOSIS — C61 PROSTATE CANCER: Primary | ICD-10-CM

## 2025-04-16 DIAGNOSIS — Z79.899 ENCOUNTER FOR LONG-TERM (CURRENT) USE OF MEDICATIONS: ICD-10-CM

## 2025-04-16 DIAGNOSIS — M81.0 OSTEOPOROSIS, UNSPECIFIED OSTEOPOROSIS TYPE, UNSPECIFIED PATHOLOGICAL FRACTURE PRESENCE: Primary | ICD-10-CM

## 2025-04-16 DIAGNOSIS — Z93.59 SUPRAPUBIC CATHETER: ICD-10-CM

## 2025-04-16 PROCEDURE — 99215 OFFICE O/P EST HI 40 MIN: CPT | Mod: S$GLB,,, | Performed by: NURSE PRACTITIONER

## 2025-04-16 PROCEDURE — 3288F FALL RISK ASSESSMENT DOCD: CPT | Mod: CPTII,S$GLB,, | Performed by: NURSE PRACTITIONER

## 2025-04-16 PROCEDURE — G2211 COMPLEX E/M VISIT ADD ON: HCPCS | Mod: S$GLB,,, | Performed by: NURSE PRACTITIONER

## 2025-04-16 PROCEDURE — 1101F PT FALLS ASSESS-DOCD LE1/YR: CPT | Mod: CPTII,S$GLB,, | Performed by: NURSE PRACTITIONER

## 2025-04-16 PROCEDURE — 3078F DIAST BP <80 MM HG: CPT | Mod: CPTII,S$GLB,, | Performed by: NURSE PRACTITIONER

## 2025-04-16 PROCEDURE — 63600175 PHARM REV CODE 636 W HCPCS: Mod: JZ,TB | Performed by: NURSE PRACTITIONER

## 2025-04-16 PROCEDURE — 1159F MED LIST DOCD IN RCRD: CPT | Mod: CPTII,S$GLB,, | Performed by: NURSE PRACTITIONER

## 2025-04-16 PROCEDURE — 96402 CHEMO HORMON ANTINEOPL SQ/IM: CPT

## 2025-04-16 PROCEDURE — 99999 PR PBB SHADOW E&M-EST. PATIENT-LVL III: CPT | Mod: PBBFAC,,, | Performed by: NURSE PRACTITIONER

## 2025-04-16 PROCEDURE — 1126F AMNT PAIN NOTED NONE PRSNT: CPT | Mod: CPTII,S$GLB,, | Performed by: NURSE PRACTITIONER

## 2025-04-16 PROCEDURE — 3074F SYST BP LT 130 MM HG: CPT | Mod: CPTII,S$GLB,, | Performed by: NURSE PRACTITIONER

## 2025-04-16 RX ORDER — ZOLEDRONIC ACID 5 MG/100ML
5 INJECTION, SOLUTION INTRAVENOUS
OUTPATIENT
Start: 2025-04-16

## 2025-04-16 RX ORDER — SODIUM CHLORIDE 0.9 % (FLUSH) 0.9 %
10 SYRINGE (ML) INJECTION
Status: DISCONTINUED | OUTPATIENT
Start: 2025-04-16 | End: 2025-04-16

## 2025-04-16 RX ORDER — ZOLEDRONIC ACID 5 MG/100ML
5 INJECTION, SOLUTION INTRAVENOUS
Status: DISCONTINUED | OUTPATIENT
Start: 2025-04-16 | End: 2025-04-16 | Stop reason: CLARIF

## 2025-04-16 RX ORDER — HEPARIN 100 UNIT/ML
500 SYRINGE INTRAVENOUS
OUTPATIENT
Start: 2025-04-16

## 2025-04-16 RX ORDER — SODIUM CHLORIDE 0.9 % (FLUSH) 0.9 %
10 SYRINGE (ML) INJECTION
OUTPATIENT
Start: 2025-04-16

## 2025-04-16 RX ADMIN — LEUPROLIDE ACETATE 22.5 MG: KIT at 10:04

## 2025-04-16 NOTE — PLAN OF CARE
Pt seated. VS charted. Assessment done. Awaiting Lupron injection from pharmacy.     Pt tolerated Lupron injection well. Ambulated off unit independently with no issues.

## 2025-04-16 NOTE — PROGRESS NOTES
[facility]: New patient visit  Best Contact Phone Number(s): [phone] (home)       Cancer/Stage/TNM:    Cancer Staging   Primary adenocarcinoma of lower lobe of left lung  Staging form: Lung, AJCC 8th Edition  - Clinical stage from 10/4/2024: cT2a, cN0 - Unsigned        Reason for visit:  Locally recurrent prostate cancer    Molecular:  Germline Testing: N/A  Somatic Testing: N/A    Treatment History:   2007      EBRT           HPI:   Jorge Bourne is a 75 y.o. male with locally recurrent prostate cancer. Initially diagnosed ~2007, he reportedly underwent definitive RT therapy. Post treatment complicated by bladder outlet obstruction and urethro-cutaneous fistula requiring SPT placement 2016. He was lost to urologic followup, later found to have PSA 36.6 11/2024 in setting of workup for  stage IB lung cancer managed via SBRT. PSMA imaging 11/25/24 with locally recurrent disease but no beckie or distant metastases. He presents to  medical oncology clinic for follow up.    Interval history:  Presents to clinic with daughter for follow up of prostate cancer.  Currently being treated with both ADT + darolutamide.  Has is taking darolutamide as prescribed.  Tolerating medication well.  Denies hot flashes.  Energy level remains the same.      No signficant new concerns today. Denies signficant pain. SPT functioning.  Being exchanged monthly by home health nurse.  Deneis leakage about fistula.           Lab Results   Component Value Date    PSADIAG 2.0 03/14/2025    PSADIAG 13.8 (H) 02/13/2025    PSADIAG 40.3 (H) 01/16/2025    PSADIAG 36.8 (H) 11/14/2024    PSA 1.06 04/15/2025    PSA 4.8 (H) 02/22/2010    PSA 5.0 (H) 09/16/2009    PSA 4.8 12/17/2008         History has been obtained by chart review and discussion with the patient.     Oncology History   Prostate cancer   2007 Initial Diagnosis    Initial diagnosis ~2007. Per reports received radiation at [facility] (Dr. Knight). He thinks he may  have had concurrent hormonal therapy.      2/22/2010 Tumor Markers    02/22/2010  PSA 4.8     1/2015 Notable Event    Admission for acute urinary obstruction. Tramautic thomas insertion     12/2015 Notable Event    Incision and drainage of perineal abscess on 12/12/15     1/2016 -  Hospital Admission    January 2016 with a PE, and was noted to have leakage per his perineum, concern for urethrocutaneous fistula. Thomas catheter was placed over a wire at bedside and he was discharged home with this in place     4/2016 Procedure    Suprapubic catheter placement     11/14/2024 Tumor Markers    PSA 36.8     11/25/2024 Imaging Significant Findings    PSMA PET CT   Impression:  1. In this patient with elevated PSA there is radiotracer uptake within the prostate with regions of suspected extraprostatic extension of the radiotracer uptake involving the seminal vesicles and anterior rectal wall.  Consider further evaluation with dedicated prostate MRI to evaluate for extent of disease.  2. No radiotracer is avid disease elsewhere.  3. Similar size of biopsy proven left lower lobe lung adenocarcinoma.  4. Additional findings, as above.     Primary adenocarcinoma of lower lobe of left lung   4/2024 Imaging Significant Findings    04/22/24 CTA Chest  Impression:  1. No pulmonary embolism to the segmental level.  2. Somewhat nodular appearing left lower lobe opacity, suspicious for an infectious process versus a neoplasm.  Recommend further evaluation with 3 month follow-up chest CT, PET-CT, or tissue sampling.  3. Minimally prominent subcarinal lymph node, recommend attention on follow-up.  4. Small left pleural effusion.  5. Moderate emphysematous changes.     8/2024 Imaging Significant Findings      08/05/24 CT Chest  Impression:  - Persistence of nodular consolidation with cavitary change within the left lower lobe abutting the major fissure with spiculated margin.  While there has not been a significant change in size of the  nodular consolidation when compared to the prior examinations the persistence over this time interval increases risk of a malignant process.  Further evaluation with tissue sampling is recommended.  - Coronary artery atherosclerosis.  - Moderate emphysematous changes.     8/30/2024 Biopsy    08/30/24   1. LUNG, LEFT LOWER LOBE, TRANSBRONCHIAL BIOPSY:   Adenocarcinoma, well-differentiated   2. LUNG, LEFT LOWER LOBE, BAL-CYTOLOGY:   Negative for malignant cells.   3. LYMPH NODE, [id number] AND CELL BLOCK:   Negative for malignant cells.   Lymphocytes present   Comment   The lung biopsy shows a well-differenated adenocarcinoma, mucinous type.   Tissue will be sent for TEMPUS AND PDL1 testing.      10/4/2024 Initial Diagnosis    Primary adenocarcinoma of lower lobe of left lung      Initial Diagnosis       10/4/2024 Imaging Significant Findings    10/04/24 FDG PET CT  Impression:  - Left lower lobe cavitating lung mass with background metabolic activity compatible with recently diagnosed adenocarcinoma.  No hypermetabolic lesions elsewhere to suggest metastatic disease.  - Focal hypermetabolic activity in the distal esophagus without CT correlate lesion, which can be seen in esophagitis.     11/14/2024 Imaging Significant Findings    11/14/24: CT Chest  Impression:  - Slightly enlarged 3.4 cm cavitary mass in the left lower lobe, compatible with biopsy-proven adenocarcinoma.  It broadly abuts the left major fissure, but does not extend into the left upper lobe.  - No lymphadenopathy.  - Few micro nodules in the right lung, which are unchanged and likely benign.        12/11/2024 - 12/17/2024 Radiation Therapy    Treating physician: Raoul Paez  Treatment Summary  Course: C1 Thorax 2024    Treatment Site Ref. ID Energy Dose/Fx (Gy) #Fx Dose Correction (Gy) Total Dose (Gy) Start Date End Date Elapsed Days   SB LLL PTV 6X 11 5 / 5 0 55 12/11/2024 12/17/2024 6              Past Medical History:   Diagnosis Date     Cancer     prostate    Seizures          Past Surgical History:   Procedure Laterality Date    BRAIN SURGERY      COLONOSCOPY N/A 1/31/2024    Procedure: COLONOSCOPY;  Surgeon: Robert Nuñez MD;  Location: [facility];  Service: Endoscopy;  Laterality: N/A;    COLONOSCOPY W/ POLYPECTOMY  01/31/2024    5 poylps; large with 3 clips    ENDOBRONCHIAL ULTRASOUND N/A 8/30/2024    Procedure: ENDOBRONCHIAL ULTRASOUND (EBUS);  Surgeon: Torres Pagan MD;  Location: [facility] 2ND FLR;  Service: Pulmonary;  Laterality: N/A;    FRACTURE SURGERY      left femur hardware    PROSTATE SURGERY      ROBOTIC BRONCHOSCOPY N/A 5/31/2024    Procedure: ROBOTIC BRONCHOSCOPY;  Surgeon: Torres Pagan MD;  Location: [facility] 2ND FLR;  Service: Pulmonary;  Laterality: N/A;    ROBOTIC BRONCHOSCOPY N/A 8/30/2024    Procedure: ROBOTIC BRONCHOSCOPY;  Surgeon: Torres Pagan MD;  Location: [facility] 2ND FLR;  Service: Pulmonary;  Laterality: N/A;         Review of patient's allergies indicates:  No Known Allergies      Current Outpatient Medications   Medication Sig Dispense Refill    cholecalciferol, vitamin D3, (VITAMIN D3) 25 mcg (1,000 unit) capsule Take 1 capsule (1,000 Units total) by mouth once daily. 30 capsule 11    darolutamide 300 mg Tab Take 2 tablets (600 mg) by mouth 2 (two) times a day. 120 tablet 5    fluticasone propionate (FLONASE) 50 mcg/actuation nasal spray 1 spray (50 mcg total) by Each Nostril route 2 (two) times daily as needed. 15 g 0    cetirizine (ZYRTEC) 10 MG tablet Take 1 tablet (10 mg total) by mouth once daily. 30 tablet 0     Current Facility-Administered Medications   Medication Dose Route Frequency Provider Last Rate Last Admin    cyanocobalamin injection 1,000 mcg  1,000 mcg Intramuscular Q7 Days René Bauer MD   1,000 mcg at 07/29/19 0856     Facility-Administered Medications Ordered in Other Visits   Medication Dose Route Frequency Provider Last Rate Last Admin    0.9%  NaCl infusion    "Intravenous Continuous Robert Nuñez MD        0.9% NaCl 100 mL flush bag   Intravenous 1 time in Clinic/HOD Valentin Mojica MD        leuprolide (LUPRON) injection 22.5 mg  22.5 mg Intramuscular 1 time in Clinic/HOD Inga Lord, DUSTIN        sodium chloride 0.9% flush 10 mL  10 mL Intravenous PRN Valentin Mojica MD        zoledronic acid-mannitol & water 5 mg/100 mL infusion 5 mg  5 mg Intravenous 1 time in Clinic/HOD Valentin Mojica MD         Review of Systems   Constitutional:  Positive for malaise/fatigue. Negative for chills, fever and weight loss.        Denies hot flashes   Respiratory:  Negative for cough and shortness of breath.    Cardiovascular:  Negative for chest pain, palpitations and leg swelling.   Gastrointestinal:  Negative for constipation and diarrhea.   Genitourinary:  Negative for dysuria, flank pain, frequency, hematuria and urgency.   Musculoskeletal:  Negative for back pain, joint pain and neck pain.   Neurological:  Negative for dizziness and weakness.          Objective:      Physical Exam:   /74 (BP Location: Left arm, Patient Position: Sitting)   Pulse 64   Temp 96.6 °F (35.9 °C) (Temporal)   Resp 16   Ht 5' 6" (1.676 m)   Wt 55.9 kg (123 lb 3.8 oz)   SpO2 98%   BMI 19.89 kg/m²       ECOG Performance status: (2) Ambulatory and capable of self care, unable to carry out work activity, up and about > 50% or waking hours     Physical Exam  Constitutional:       General: He is not in acute distress.     Appearance: Normal appearance.   HENT:      Head: Normocephalic.   Eyes:      General: No scleral icterus.     Extraocular Movements: Extraocular movements intact.      Conjunctiva/sclera: Conjunctivae normal.   Cardiovascular:      Rate and Rhythm: Normal rate and regular rhythm.      Pulses: Normal pulses.      Heart sounds: Normal heart sounds.   Pulmonary:      Effort: Pulmonary effort is normal. No respiratory distress.      Breath sounds: Normal breath " sounds.   Abdominal:      General: Bowel sounds are normal. There is no distension.      Palpations: Abdomen is soft.   Musculoskeletal:      Cervical back: Normal range of motion and neck supple.      Right lower leg: No edema.      Left lower leg: No edema.   Skin:     General: Skin is warm and dry.   Neurological:      Mental Status: He is alert and oriented to person, place, and time.      Motor: No weakness.   Psychiatric:         Mood and Affect: Mood normal.         Behavior: Behavior normal.         Thought Content: Thought content normal.          Recent Labs:   Lab Results   Component Value Date    WBC 5.82 04/15/2025    RBC 3.42 (L) 04/15/2025    HGB 10.4 (L) 04/15/2025    HCT 32.8 (L) 04/15/2025     04/15/2025     04/15/2025    K 4.2 04/15/2025     04/15/2025    CO2 21 (L) 04/15/2025    GLU 90 03/14/2025    BUN 13 04/15/2025    CREATININE 0.8 04/15/2025    CALCIUM 9.3 04/15/2025    PHOS 2.6 (L) 01/21/2015    BILITOT 0.3 04/15/2025    AST 12 04/15/2025    ALT 11 04/15/2025          Lab Results   Component Value Date    PSADIAG 2.0 03/14/2025    PSADIAG 13.8 (H) 02/13/2025    PSADIAG 40.3 (H) 01/16/2025    PSADIAG 36.8 (H) 11/14/2024    PSA 1.06 04/15/2025    PSA 4.8 (H) 02/22/2010    PSA 5.0 (H) 09/16/2009    PSA 4.8 12/17/2008        Cardiovascular Screening:  Primary care physician: Hossein Olivas MD      The ASCVD Risk score (Elliott DK, et al., 2019) failed to calculate for the following reasons:    Cannot find a previous HDL lab    Cannot find a previous total cholesterol lab    EKG:   Results for orders placed or performed during the hospital encounter of 04/22/24   EKG 12-lead    Collection Time: 04/22/24 11:23 AM   Result Value Ref Range    QRS Duration 86 ms    OHS QTC Calculation 439 ms    Narrative    Test Reason : R07.9,    Vent. Rate : 064 BPM     Atrial Rate : 064 BPM     P-R Int : 154 ms          QRS Dur : 086 ms      QT Int : 426 ms       P-R-T Axes : 076 048 040  degrees     QTc Int : 439 ms    Normal sinus rhythm  Possible Anterior infarct ,age undetermined  Abnormal ECG    Confirmed by Renae Acevedo MD ([id number]) on 4/22/2024 1:12:05 PM    Referred By:             Confirmed By:Renae Acevedo MD       Body mass index is 19.89 kg/m².    Lab Results   Component Value Date    CHOL 184 03/21/2019    LDLCALC 128 (H) 03/21/2019    HDL 36 (L) 03/21/2019    TRIG 99 03/21/2019          Bone Health    Lab Results   Component Value Date    [id number] 37 01/16/2025        No results found for this or any previous visit.       PSMA PET IMAGING+     No results found for this or any previous visit.       I have personally reviewed the above imaging.     Path:   Reviewed pathology as documented above.      Diagnoses:     1. Prostate cancer    2. Encounter for monitoring androgen deprivation therapy    3. Encounter for long-term (current) use of medications    4. Primary adenocarcinoma of lower lobe of left lung    5. Other osteoporosis without current pathological fracture    6. Suprapubic catheter            Assessment and Plan:     1. Prostate cancer  Overview:  Locally recurrent prostate cancer. Initially diagnosed ~2007, he reportedly underwent definitive RT therapy. Post treatment complicated by bladder outlet obstruction and urethro-cutaneous fistula requiring SPT placement 2016. He was lost to urologic followup, later found to have PSA 36.6 11/2024 in setting of workup for  stage IB lung cancer managed via SBRT. PSMA imaging 11/25/24 with locally recurrent disease but no beckie or distant metastases.    Assessment & Plan:  Had an MRI coming up in January, but missed appt. Will review with urology and rad onc to consider salvage options once obtained.    - MRI prostate rescheduled   - Started leuprolide q3mth 1/2025  - started darolutamide 2/2025  - Referral to cancer genetics  - DEXA reviewed, continue vitamin D, zoledronic acid today      2. Encounter for monitoring androgen  deprivation therapy    3. Encounter for long-term (current) use of medications    4. Primary adenocarcinoma of lower lobe of left lung  Assessment & Plan:  - Currently undergoing SBRT with Dr. Vann  - Surveillance per rad onc      5. Other osteoporosis without current pathological fracture  Overview:  The L1 to L4 vertebral bone mineral density is equal to0.818 g/cm squared with a T score of -2.5.  The left femoral neck bone mineral density is equal to 0.560 g/cm squared with a T score of -2.7.  There is a 14% risk of a major osteoporotic fracture and a 6.3% risk of hip fracture in the next 10 years (FRAX).    Assessment & Plan:  - reviewed DEXA scan   - continue Vit D and calcium supplements  - zoledronic acid yearly - next 1/2026         6. Suprapubic catheter  Overview:  In setting of urehtrocutaneous fistula 2016    Assessment & Plan:  - draining appropriately  - being exchanged by home health monthly         Other orders  -     Cancel: leuprolide (LUPRON) injection 22.5 mg        Follow up:   Route Chart for Scheduling    Med Onc Chart Routing      Follow up with physician 3 months. rtc with labs (cbc, cmp, psa), leuprolide, and to see Dr. mojica   Follow up with DANNY    Infusion scheduling note    Injection scheduling note leuprolide every 3 months   Labs CBC, CMP and PSA   Scheduling:  Preferred lab:  Lab interval:  cbc, cmp, psa   Imaging    Pharmacy appointment    Other referrals                     Supportive Plan Information  OP PROSTATE LEUPROLIDE Q3MO Valentin Mojica MD   Associated Diagnosis: Prostate cancer   noted on 8/30/2018   Line of treatment: First Line   Treatment goal: Palliative     Upcoming Treatment Dates - OP PROSTATE LEUPROLIDE Q3MO    7/3/2025       Chemotherapy       leuprolide (LUPRON) injection 22.5 mg  9/25/2025       Chemotherapy       leuprolide (LUPRON) injection 22.5 mg  12/18/2025       Chemotherapy       leuprolide (LUPRON) injection 22.5 mg  3/12/2026       Chemotherapy        leuprolide (LUPRON) injection 22.5 mg    Therapy Plan Information  ZOLEDRONIC ACID (RECLAST) VISIT for Osteoporosis, noted on 1/16/2025  Medications  zoledronic acid-mannitol & water 5 mg/100 mL infusion 5 mg  5 mg, Intravenous, Every visit  Flushes  heparin, porcine (PF) 100 unit/mL injection flush 500 Units  500 Units, Intravenous, PRN  sodium chloride 0.9% flush 10 mL  10 mL, Intravenous, Every visit  0.9% NaCl 100 mL flush bag  Intravenous, Every visit      No therapy plan of the specified type found.    No therapy plan of the specified type found.         The above information has been reviewed with the patient and all questions have been answered to their apparent satisfaction.  They understand that they can call the clinic with any questions.       Melissa Voltz, APRN [facility]  [address] [place] [id number]  Phone: (o) [phone] [id number] code applied: patient requires or will require a continuous, longitudinal, and active collaborative plan of care related to this patient's health condition, prostate cancer --the management of which requires the direction of a practitioner with specialized clinical knowledge, skill, and expertise.

## 2025-04-16 NOTE — ASSESSMENT & PLAN NOTE
- reviewed DEXA scan   - continue Vit D and calcium supplements  - zoledronic acid yearly - next 1/2026

## 2025-04-22 ENCOUNTER — TUMOR BOARD CONFERENCE (OUTPATIENT)
Dept: UROLOGY | Facility: HOSPITAL | Age: 75
End: 2025-04-22
Payer: MEDICARE

## 2025-04-22 NOTE — PROGRESS NOTES
Oncology History   Prostate cancer   Primary adenocarcinoma of lower lobe of left lung        PATIENT SUMMARY:   75 y.o. male with locally recurrent prostate cancer. Initially diagnosed ~2007, he reportedly underwent definitive RT therapy. Post-treatment  complicated by bladder outlet obstruction and urethro-cutaneous fistula requiring SPT placement 2016. He was lost to urologic followup, later found to have PSA 36.6 11/2024 in setting of workup for stage IB lung cancer managed via SBRT. PS[facility] imaging 11/25/24 with locally recurrent disease but no beckie or distant metastases. On Lupron and daralutamide.       PERFORMANCE STATUS:  ECOG 2    Estimated Cr: 0.8 eGFR: >60    Clinical/Pathologic Stage (TNM): Stage 1B T2a N0 M0    DISCUSSION:  Salvage therapy?  Prostate MRI review? Do not recommend radiation or ablative therpay given he has a fistula. Could attempt a salvage prostatectomy, but would be at high risk for recurrence of fistula. Tolerating current regimen well with decrease in PSA.       FACULTY IN ATTENDANCE:    Urologic Oncology: Alex Nascimento MD [], Roderick Fair MD [x] , Silvestre Sharpe MD [x], Manish Barboza MD [x], Juan Carlos Wen MD []     Medical Oncology: Durga Gomez MD [], Celeste Page MD [] , Valentin Mojica MD [x], Robb Abarca MD [] Corey Jackson MD [], Reina Pink MD [] , Sandy Mejias MD [], Tanja Roque MD [], Kristen Olivarez MD [x]    Radiation Oncology: Nabeel Devine MD [], Maverick Donnelly MD [x]     CONSULT NEEDED:     [] Urologic Oncology    [] Med Onc    [] Rad/Onc  [] CRS [] Surg Onc    [x] Treatment Guidelines (NCCN and AUA) reviewed and care planned is consistent with guidelines.    TUMOR BOARD RECOMMENDATIONS/PLAN/CONSENSUS:     Case discussed among group. Pathology and radiologic images were reviewed (if applicable).    Plan: Continue ADT and Darolutamide

## 2025-04-25 ENCOUNTER — DOCUMENT SCAN (OUTPATIENT)
Dept: HOME HEALTH SERVICES | Facility: HOSPITAL | Age: 75
End: 2025-04-25
Payer: MEDICARE

## 2025-05-15 ENCOUNTER — DOCUMENT SCAN (OUTPATIENT)
Dept: HOME HEALTH SERVICES | Facility: HOSPITAL | Age: 75
End: 2025-05-15
Payer: MEDICARE

## 2025-05-26 PROCEDURE — G0179 MD RECERTIFICATION HHA PT: HCPCS | Mod: ,,, | Performed by: FAMILY MEDICINE

## 2025-05-27 ENCOUNTER — RESULTS FOLLOW-UP (OUTPATIENT)
Dept: PRIMARY CARE CLINIC | Facility: CLINIC | Age: 75
End: 2025-05-27

## 2025-05-27 ENCOUNTER — OFFICE VISIT (OUTPATIENT)
Dept: PRIMARY CARE CLINIC | Facility: CLINIC | Age: 75
End: 2025-05-27
Payer: MEDICARE

## 2025-05-27 VITALS
TEMPERATURE: 98 F | SYSTOLIC BLOOD PRESSURE: 122 MMHG | HEIGHT: 66 IN | BODY MASS INDEX: 18.67 KG/M2 | OXYGEN SATURATION: 95 % | DIASTOLIC BLOOD PRESSURE: 60 MMHG | WEIGHT: 116.19 LBS | HEART RATE: 62 BPM | RESPIRATION RATE: 16 BRPM

## 2025-05-27 DIAGNOSIS — E11.9 TYPE 2 DIABETES MELLITUS WITHOUT COMPLICATION, WITHOUT LONG-TERM CURRENT USE OF INSULIN: ICD-10-CM

## 2025-05-27 DIAGNOSIS — Z00.00 ANNUAL PHYSICAL EXAM: Primary | ICD-10-CM

## 2025-05-27 DIAGNOSIS — J01.80 OTHER SUBACUTE SINUSITIS: ICD-10-CM

## 2025-05-27 DIAGNOSIS — E78.5 HYPERLIPIDEMIA, UNSPECIFIED HYPERLIPIDEMIA TYPE: Primary | ICD-10-CM

## 2025-05-27 PROBLEM — G40.89 OTHER SEIZURES: Status: ACTIVE | Noted: 2025-05-27

## 2025-05-27 PROCEDURE — 99999 PR PBB SHADOW E&M-EST. PATIENT-LVL III: CPT | Mod: PBBFAC,,,

## 2025-05-27 RX ORDER — CETIRIZINE HYDROCHLORIDE 10 MG/1
10 TABLET ORAL DAILY
Qty: 30 TABLET | Refills: 11 | Status: SHIPPED | OUTPATIENT
Start: 2025-05-27

## 2025-05-27 NOTE — PROGRESS NOTES
Assessment:       1. Annual physical exam    2. Type 2 diabetes mellitus without complication, without long-term current use of insulin    3. Other subacute sinusitis       Plan:       Annual physical exam  -     Lipid Panel; Future; Expected date: 05/27/2025  -     Hemoglobin A1C; Future; Expected date: 05/27/2025    Type 2 diabetes mellitus without complication, without long-term current use of insulin    Other subacute sinusitis  -     cetirizine (ZYRTEC) 10 MG tablet; Take 1 tablet (10 mg total) by mouth once daily.  Dispense: 30 tablet; Refill: 11    Assessment & Plan    - Reviewed prostate medication, noting effectiveness in lowering levels.  - Evaluated overall health status through exam and review of existing lab work.  - Considered RSV vaccination, but patient declined.    TYPE 2 DIABETES MELLITUS WITHOUT COMPLICATIONS:  - Ordered hemoglobin A1C to screen for diabetes and monitor patient's status.    NODULAR PROSTATE WITH LOWER URINARY TRACT SYMPTOMS:  - Mr. Martinez prostate levels are significantly reduced, indicating effective management of nodular prostate symptoms.  - Will continue Flomax (tamsulosin) as needed as medication is working effectively.    DEPENDENCE ON MEDICAL DEVICES:  - Continue nursing assistance for catheter changes to ensure proper management.    OTHER:  - Ordered lipid panel.           Subjective:    Patient ID: Jorge Bourne is a 75 y.o. male.  Chief Complaint: Annual Exam    HPI  History of Present Illness    Mr. Bourne presents for an annual physical exam.  Mr. Bourne is here for his regular annual physical. He has been receiving treatment for prostate issues, with his medication working well and levels having decreased significantly. He has a home health nurse who visits to assist with changing his catheter. He currently lives with his daughter's mother. He has o complaints or concerns at today's visit.       Review of Systems   Constitutional:  Negative for appetite  "change, fatigue, fever and unexpected weight change.   HENT:  Negative for hearing loss and sore throat.    Eyes:  Negative for pain and visual disturbance.   Respiratory:  Negative for cough, shortness of breath and wheezing.    Cardiovascular:  Negative for chest pain, palpitations and leg swelling.   Gastrointestinal:  Negative for abdominal pain, blood in stool, constipation, diarrhea, nausea and vomiting.   Genitourinary:  Negative for dysuria.   Musculoskeletal:  Negative for arthralgias.   Neurological:  Negative for dizziness and headaches.   Psychiatric/Behavioral:  Negative for suicidal ideas.        Objective:      Vitals:    05/27/25 1050   BP: 122/60   BP Location: Right arm   Patient Position: Sitting   Pulse: 62   Resp: 16   Temp: 98.2 °F (36.8 °C)   TempSrc: Oral   SpO2: 95%   Weight: 52.7 kg (116 lb 2.9 oz)   Height: 5' 6" (1.676 m)     BP Readings from Last 5 Encounters:   05/27/25 122/60   04/16/25 110/74   03/19/25 (!) 190/84   03/10/25 (!) 170/75   02/14/25 102/78     Wt Readings from Last 5 Encounters:   05/27/25 52.7 kg (116 lb 2.9 oz)   04/16/25 55.9 kg (123 lb 3.8 oz)   03/19/25 56.1 kg (123 lb 10.9 oz)   03/10/25 55.9 kg (123 lb 3.8 oz)   02/14/25 56.5 kg (124 lb 10.7 oz)     Physical Exam  Vitals and nursing note reviewed.   Constitutional:       General: He is not in acute distress.  HENT:      Head: Atraumatic.   Cardiovascular:      Rate and Rhythm: Normal rate and regular rhythm.      Heart sounds: No murmur heard.  Pulmonary:      Effort: Pulmonary effort is normal. No respiratory distress.      Breath sounds: Normal breath sounds. No wheezing, rhonchi or rales.   Musculoskeletal:         General: Normal range of motion.      Right lower leg: No edema.      Left lower leg: No edema.   Neurological:      Mental Status: He is alert and oriented to person, place, and time.      Gait: Gait normal.   Psychiatric:         Mood and Affect: Mood normal.         Thought Content: Thought " content normal.     Physical Exam    Cardiovascular: Good blood flow.  Lungs: All normal.         Lab Results   Component Value Date    WBC 5.82 04/15/2025    HGB 10.4 (L) 04/15/2025    HCT 32.8 (L) 04/15/2025     04/15/2025    CHOL 184 03/21/2019    TRIG 99 03/21/2019    HDL 36 (L) 03/21/2019    ALT 11 04/15/2025    AST 12 04/15/2025     04/15/2025    K 4.2 04/15/2025     04/15/2025    CREATININE 0.8 04/15/2025    BUN 13 04/15/2025    CO2 21 (L) 04/15/2025    TSH 2.29 02/06/2019    PSA 1.06 04/15/2025    INR 1.1 01/21/2015      This note was generated with the assistance of ambient listening technology. Verbal consent was obtained by the patient and accompanying visitor(s) for the recording of patient appointment to facilitate this note. I attest to having reviewed and edited the generated note for accuracy, though some syntax or spelling errors may persist. Please contact the author of this note for any clarification.    ASHLEY Sparks, REGANP-C

## 2025-07-09 ENCOUNTER — HOSPITAL ENCOUNTER (OUTPATIENT)
Dept: RADIOLOGY | Facility: HOSPITAL | Age: 75
Discharge: HOME | End: 2025-07-09
Attending: STUDENT IN AN ORGANIZED HEALTH CARE EDUCATION/TRAINING PROGRAM
Payer: MEDICARE

## 2025-07-09 ENCOUNTER — OFFICE VISIT (OUTPATIENT)
Dept: RADIATION ONCOLOGY | Facility: CLINIC | Age: 75
End: 2025-07-09
Payer: MEDICARE

## 2025-07-09 VITALS
RESPIRATION RATE: 12 BRPM | OXYGEN SATURATION: 99 % | HEIGHT: 66 IN | BODY MASS INDEX: 19.35 KG/M2 | SYSTOLIC BLOOD PRESSURE: 136 MMHG | TEMPERATURE: 98 F | HEART RATE: 56 BPM | WEIGHT: 120.38 LBS | DIASTOLIC BLOOD PRESSURE: 63 MMHG

## 2025-07-09 DIAGNOSIS — C34.90 MALIGNANT NEOPLASM OF UNSPECIFIED PART OF UNSPECIFIED BRONCHUS OR LUNG: Primary | ICD-10-CM

## 2025-07-09 DIAGNOSIS — C34.90 MALIGNANT NEOPLASM OF UNSPECIFIED PART OF UNSPECIFIED BRONCHUS OR LUNG: ICD-10-CM

## 2025-07-09 PROCEDURE — 71250 CT THORAX DX C-: CPT | Mod: TC

## 2025-07-09 PROCEDURE — 99999 PR PBB SHADOW E&M-EST. PATIENT-LVL III: CPT | Mod: PBBFAC,,, | Performed by: STUDENT IN AN ORGANIZED HEALTH CARE EDUCATION/TRAINING PROGRAM

## 2025-07-09 NOTE — PROGRESS NOTES
"[facility] Follow Up Note    Assessment:  Jorge Bourne is a 75 y.o. male with a group stage IB, M2cC8G5 adenocarcinoma of the LLL s/p SBRT to 55 Gy in 5 fractions, completed 12/17/24.  Recurrent prostate cancer, on ADT with med onc.   CT chest with continued response on personal review. No appreciated new nodules, mediastinal or hilar adenopathy, acknowledging no contrast on todays scan.  No toxicities observed from his radiothearpy   ECOG: (1) Restricted in physically strenuous activity, ambulatory and able to do work of light nature        Plan:  Follow up final read  Tentative plan to RTC 6 months with CT chest. (Pt prefers 6 over 4 months).        Oncologic History:  He has a history of seizure and prostate cancer s/p radiation?? completed 2008 they believe at [facility].  Has suprapubic catheter for recurrent infections and incontinence?  4/22/24: CTA Chest for cough demonstrated  nodular appearing LLL opacity, suspicious for neoplasm. 1cm subcarinal LN  5/24/24: CT chest WO: stable LLL nodule with cavitary changes. No mediastinal or hilar adenopathy.   8/5/24: CT chest WO: stable nodular consolidation with cavitary changes in the LLL, abutting the major fissure, measuring 3cm and 4cm cc on personal reivew. No hilar or mediastinal adenopathy but limited by non contrast imaging.   8/30/24: Bronch and EBUS  Op note: no endobronchial lesions. 2.3 cm LLL nodule biopsied. 11L sampled. No path appearing LN at 11R, 4R, 7, 4L  Path: DENIZ well differentiated adenocarcinoma mucinous type. "Lymph node" (11L per op note) negative for malignancy with lymphocytes present.    KRAS G12D  PDL1 pending  9/27/24: MRI brain GINA, with moderate to large right temporal encephalomalacia subjacent to craniotomy which may be sequela of prior lesion resection   11/14/24: PSA 36.8  11/25/24: PSMA PET with In this patient with elevated PSA there is radiotracer uptake within the prostate with regions of suspected " extraprostatic extension of the radiotracer uptake involving the seminal vesicles and anterior rectal wall no radiotracer avid disease elsewhere. Similar size LLL adenocarcinoma.  12/11/24 - 12/17/24: SBRT 55 Gy in 5 fractions to the LLL  1/16/2025: started lupron and darolutamide with med onc for prostate cancer.   3/14/25: PSA 2      Possibility of pregnancy: N/A  History of prior irradiation: Yes - prostate only per wife and as above.   History of prior systemic anti-cancer therapy: No  History of collagen vascular disease: No  Implanted electronic device (pacer/defib/nerve stimulator): No     History of Present Illness:  Jorge Bourne presents today for follow up.     No complaints. On ADT for prostate cancer.  Notably no worsening cough, CP, SOB, hemoptysis or CW pain. Here with family.     Review of Systems:  ROS as above    Social History:  Social History     Tobacco Use    Smoking status: Former     Types: Cigarettes     Start date: 2018   Substance Use Topics    Alcohol use: Yes     Comment: every now and then    Drug use: No       Past Medical History:  Past Medical History:   Diagnosis Date    Cancer     prostate    Seizures        Past Surgical History:   Procedure Laterality Date    BRAIN SURGERY      COLONOSCOPY N/A 1/31/2024    Procedure: COLONOSCOPY;  Surgeon: Robert Nuñez MD;  Location: [facility];  Service: Endoscopy;  Laterality: N/A;    COLONOSCOPY W/ POLYPECTOMY  01/31/2024    5 poylps; large with 3 clips    ENDOBRONCHIAL ULTRASOUND N/A 8/30/2024    Procedure: ENDOBRONCHIAL ULTRASOUND (EBUS);  Surgeon: Torres Pagan MD;  [facility];  Service: Pulmonary;  Laterality: N/A;    FRACTURE SURGERY      left femur hardware    PROSTATE SURGERY      ROBOTIC BRONCHOSCOPY N/A 5/31/2024    Procedure: ROBOTIC BRONCHOSCOPY;  Surgeon: Torres Pagan MD;  [facility];  Service: Pulmonary;  Laterality: N/A;    ROBOTIC BRONCHOSCOPY N/A 8/30/2024    Procedure: ROBOTIC  "BRONCHOSCOPY;  Surgeon: Torres Pagan MD;  Location: [facility] OR [address] [place] [address];  Service: Pulmonary;  Laterality: N/A;         Medications:  Current Outpatient Medications on File Prior to Visit   Medication Sig Dispense Refill    cholecalciferol, vitamin D3, (VITAMIN D3) 25 mcg (1,000 unit) capsule Take 1 capsule (1,000 Units total) by mouth once daily. 30 capsule 11    clindamycin (CLEOCIN) 150 MG capsule Take 2 capsules (300 mg total) by mouth every 8 (eight) hours. for 7 days 42 capsule 0    darolutamide 300 mg Tab Take 2 tablets (600 mg) by mouth 2 (two) times a day. 120 tablet 5    cetirizine (ZYRTEC) 10 MG tablet Take 1 tablet (10 mg total) by mouth once daily. (Patient not taking: Reported on 7/9/2025) 30 tablet 11     Current Facility-Administered Medications on File Prior to Visit   Medication Dose Route Frequency Provider Last Rate Last Admin    0.9%  NaCl infusion   Intravenous Continuous Robert Nuñez MD           Allergies:  Review of patient's allergies indicates:  No Known Allergies    Exam:  Vitals:    07/09/25 1117   BP: 136/63   BP Location: Left arm   Patient Position: Sitting   Pulse: (!) 56   Resp: 12   Temp: 97.7 °F (36.5 °C)   TempSrc: Oral   SpO2: 99%   Weight: 54.6 kg (120 lb 5.9 oz)   Height: 5' 6" (1.676 m)         Constitutional: Pleasant 75 y.o. male in no acute distress.  Well nourished. Well groomed.   HEENT: Normocephalic and atraumatic   Cardiovascular: Upper extremities warm to touch  Lungs: No audible wheezing.  Normal effort.   Musculoskeletal: No gross MSK deformities. Ambulates without assistance  Skin: No rashes appreciated.  Psych: Alert and oriented with appropriate mood and affect.  Neuro:  Grossly normal.    Data Review:  Information obtained from Jorge Bourne and via chart review.     Independent Interpretation of Test(s): CT chest from today was personally reviewed as above.              Monroe Paez MD  Radiation Oncology          "

## 2025-07-15 ENCOUNTER — EXTERNAL HOME HEALTH (OUTPATIENT)
Dept: HOME HEALTH SERVICES | Facility: HOSPITAL | Age: 75
End: 2025-07-15
Payer: MEDICARE

## 2025-07-15 ENCOUNTER — TELEPHONE (OUTPATIENT)
Dept: HEMATOLOGY/ONCOLOGY | Facility: CLINIC | Age: 75
End: 2025-07-15
Payer: MEDICARE

## 2025-07-18 ENCOUNTER — TELEPHONE (OUTPATIENT)
Dept: HEMATOLOGY/ONCOLOGY | Facility: CLINIC | Age: 75
End: 2025-07-18
Payer: MEDICARE

## 2025-07-18 NOTE — TELEPHONE ENCOUNTER
Copied from CRM #[id number]. Topic: Appointments - Appointment Access  >> Jul 18, 2025 10:57 AM Emily wrote:  Type:  Appointment Request    Name of Caller: Pt's Daughter Ms Corrales called to schedule the pt's lab and doctor's appt and she would like a call back today  Best Call Back Number: [phone]

## 2025-08-08 ENCOUNTER — DOCUMENT SCAN (OUTPATIENT)
Dept: HOME HEALTH SERVICES | Facility: HOSPITAL | Age: 75
End: 2025-08-08
Payer: MEDICARE

## 2025-08-26 ENCOUNTER — EXTERNAL HOME HEALTH (OUTPATIENT)
Dept: HOME HEALTH SERVICES | Facility: HOSPITAL | Age: 75
End: 2025-08-26
Payer: MEDICARE

## (undated) DEVICE — GOWN POLY REINF BRTH SLV XL

## (undated) DEVICE — ADAPTER VISION PROBE & SUCTION

## (undated) DEVICE — DRESSING TRANS 6X8 TEGADERM

## (undated) DEVICE — NDL FLEXISION BIOPSY 21G

## (undated) DEVICE — DRAPE THREE-QTR REINF 53X77IN

## (undated) DEVICE — SYR SLIP TIP 20CC

## (undated) DEVICE — PACK ECLIPSE SET-UP W/O DRAPE

## (undated) DEVICE — SYS LABEL CORRECT MED

## (undated) DEVICE — BAG ION VISION PROBE

## (undated) DEVICE — PENCIL GOLF STERILE

## (undated) DEVICE — KIT ANTIFOG W/SPONG & FLUID

## (undated) DEVICE — NDL ASPIRATING VIZISHOT 20-40M

## (undated) DEVICE — LUBRICANT SURGILUBE 2 OZ

## (undated) DEVICE — CONNECTOR SWIVEL

## (undated) DEVICE — CONTAINER SPECIMEN OR STER 4OZ

## (undated) DEVICE — ADAPTER SWIVEL

## (undated) DEVICE — ALCOHOL BLEND 95%

## (undated) DEVICE — SPONGE COTTON TRAY 4X4IN

## (undated) DEVICE — CATH BRONCHOSCOPE F/BF

## (undated) DEVICE — SYR 10CC LUER LOCK

## (undated) DEVICE — BOWL STERILE LARGE 32OZ